# Patient Record
Sex: FEMALE | NOT HISPANIC OR LATINO
[De-identification: names, ages, dates, MRNs, and addresses within clinical notes are randomized per-mention and may not be internally consistent; named-entity substitution may affect disease eponyms.]

---

## 2018-10-28 PROBLEM — Z00.00 ENCOUNTER FOR PREVENTIVE HEALTH EXAMINATION: Status: ACTIVE | Noted: 2018-10-28

## 2018-11-01 ENCOUNTER — RECORD ABSTRACTING (OUTPATIENT)
Age: 63
End: 2018-11-01

## 2018-11-02 ENCOUNTER — APPOINTMENT (OUTPATIENT)
Dept: CARDIOLOGY | Facility: CLINIC | Age: 63
End: 2018-11-02
Payer: COMMERCIAL

## 2018-11-02 VITALS
HEART RATE: 65 BPM | DIASTOLIC BLOOD PRESSURE: 80 MMHG | SYSTOLIC BLOOD PRESSURE: 154 MMHG | HEIGHT: 57 IN | WEIGHT: 132 LBS | OXYGEN SATURATION: 98 % | BODY MASS INDEX: 28.48 KG/M2

## 2018-11-02 DIAGNOSIS — Z86.79 PERSONAL HISTORY OF OTHER DISEASES OF THE CIRCULATORY SYSTEM: ICD-10-CM

## 2018-11-02 DIAGNOSIS — Z78.9 OTHER SPECIFIED HEALTH STATUS: ICD-10-CM

## 2018-11-02 DIAGNOSIS — Z87.898 PERSONAL HISTORY OF OTHER SPECIFIED CONDITIONS: ICD-10-CM

## 2018-11-02 PROCEDURE — 99214 OFFICE O/P EST MOD 30 MIN: CPT

## 2019-09-09 ENCOUNTER — APPOINTMENT (OUTPATIENT)
Dept: CARDIOLOGY | Facility: CLINIC | Age: 64
End: 2019-09-09
Payer: COMMERCIAL

## 2019-09-09 ENCOUNTER — NON-APPOINTMENT (OUTPATIENT)
Age: 64
End: 2019-09-09

## 2019-09-09 VITALS
HEART RATE: 58 BPM | OXYGEN SATURATION: 97 % | DIASTOLIC BLOOD PRESSURE: 80 MMHG | SYSTOLIC BLOOD PRESSURE: 164 MMHG | WEIGHT: 135 LBS | HEIGHT: 59 IN | BODY MASS INDEX: 27.21 KG/M2

## 2019-09-09 DIAGNOSIS — Z86.39 PERSONAL HISTORY OF OTHER ENDOCRINE, NUTRITIONAL AND METABOLIC DISEASE: ICD-10-CM

## 2019-09-09 DIAGNOSIS — Z87.39 PERSONAL HISTORY OF OTHER DISEASES OF THE MUSCULOSKELETAL SYSTEM AND CONNECTIVE TISSUE: ICD-10-CM

## 2019-09-09 PROCEDURE — 99214 OFFICE O/P EST MOD 30 MIN: CPT | Mod: 25

## 2019-09-09 PROCEDURE — 93000 ELECTROCARDIOGRAM COMPLETE: CPT

## 2019-09-09 RX ORDER — NEBIVOLOL HYDROCHLORIDE 20 MG/1
20 TABLET ORAL DAILY
Refills: 0 | Status: DISCONTINUED | COMMUNITY
End: 2019-09-09

## 2019-09-09 RX ORDER — ATORVASTATIN CALCIUM 20 MG/1
20 TABLET, FILM COATED ORAL DAILY
Refills: 0 | Status: DISCONTINUED | COMMUNITY
End: 2019-09-09

## 2019-09-09 NOTE — REASON FOR VISIT
[Follow-Up - Clinic] : a clinic follow-up of [Hypertension] : hypertension [Chest Pain] : chest pain

## 2019-09-09 NOTE — DISCUSSION/SUMMARY
[FreeTextEntry1] : 1. Chest Pain: normal nuclear stress test October 2018 at [facility]. Patient has only recurrences when she is very stressed.\par \par 2. Hypertension: resume amlodipine 5mg daily. Continue Bystolic 10mg daily and Quinapril-HCT 20/25mg daily.\par \par 3. Hypercholesterolemia: continue atorvastatin 40mg daily.\par \par 4. Abnormal ECG: stable from previous ECGs.\par \par Follow up in 6 months.

## 2019-09-09 NOTE — PHYSICAL EXAM
[General Appearance - Well Developed] : well developed [Normal Appearance] : normal appearance [Well Groomed] : well groomed [General Appearance - In No Acute Distress] : no acute distress [General Appearance - Well Nourished] : well nourished [Eyelids - No Xanthelasma] : the eyelids demonstrated no xanthelasmas [Normal Conjunctiva] : the conjunctiva exhibited no abnormalities [No Oral Pallor] : no oral pallor [FreeTextEntry1] : No JVD, no carotid artery bruits auscultated bilaterally [No Oral Cyanosis] : no oral cyanosis [Exaggerated Use Of Accessory Muscles For Inspiration] : no accessory muscle use [Respiration, Rhythm And Depth] : normal respiratory rhythm and effort [Heart Rate And Rhythm] : heart rate and rhythm were normal [Auscultation Breath Sounds / Voice Sounds] : lungs were clear to auscultation bilaterally [Heart Sounds] : normal S1 and S2 [Edema] : no peripheral edema present [Murmurs] : no murmurs present [Abnormal Walk] : normal gait [Gait - Sufficient For Exercise Testing] : the gait was sufficient for exercise testing [Cyanosis, Localized] : no localized cyanosis [Nail Clubbing] : no clubbing of the fingernails [Skin Turgor] : normal skin turgor [] : no rash [Impaired Insight] : insight and judgment were intact [Affect] : the affect was normal [Memory Recent] : recent memory was not impaired

## 2019-09-09 NOTE — HISTORY OF PRESENT ILLNESS
[FreeTextEntry1] : This is a 64 year old female with history of hypertension, HLD, chest pain presents for routine cardiac follow up. She admits to type A personality. A lot of stress. She went to hospital in October 2018 for high blood pressure and chest pain. She had a normal nuclear stress test. She states she was discharged on amlodipine but ran out and stopped taking it. She is compliant with her other BP medications and reports no advers effects.

## 2019-11-04 ENCOUNTER — APPOINTMENT (OUTPATIENT)
Dept: CARDIOLOGY | Facility: CLINIC | Age: 64
End: 2019-11-04
Payer: COMMERCIAL

## 2019-11-04 VITALS
OXYGEN SATURATION: 97 % | HEART RATE: 60 BPM | DIASTOLIC BLOOD PRESSURE: 70 MMHG | WEIGHT: 135 LBS | HEIGHT: 59 IN | BODY MASS INDEX: 27.21 KG/M2 | SYSTOLIC BLOOD PRESSURE: 136 MMHG

## 2019-11-04 PROCEDURE — 99214 OFFICE O/P EST MOD 30 MIN: CPT

## 2019-11-04 RX ORDER — ATORVASTATIN CALCIUM 40 MG/1
40 TABLET, FILM COATED ORAL
Qty: 30 | Refills: 0 | Status: DISCONTINUED | COMMUNITY
Start: 2019-03-18 | End: 2019-11-04

## 2019-11-04 NOTE — DISCUSSION/SUMMARY
[FreeTextEntry1] : 1. Chest Pain: normal nuclear stress test October 2018 at [facility]. Patient has no recurrences \par \par 2. Hypertension: cont. amlodipine 5mg daily. Continue Bystolic 10mg daily and Quinapril-HCT 20/25mg daily.\par \par 3. Hypercholesterolemia: continue atorvastatin 40mg daily.\par \par 4. Abnormal ECG: stable from previous ECGs.\par \par Follow up in 12 months.

## 2019-11-04 NOTE — HISTORY OF PRESENT ILLNESS
[FreeTextEntry1] : This is a 64 year old female with history of hypertension, HLD, chest pain presents for routine cardiac follow up. She admits to type A personality. A lot of stress. She went to hospital in October 2018 for high blood pressure and chest pain. She had a normal nuclear stress test. She states she was discharged on amlodipine but ran out and stopped taking it. She is compliant with her other BP medications and reports no adverse effects.

## 2019-11-04 NOTE — PHYSICAL EXAM
[General Appearance - Well Developed] : well developed [Normal Appearance] : normal appearance [Well Groomed] : well groomed [General Appearance - Well Nourished] : well nourished [General Appearance - In No Acute Distress] : no acute distress [Normal Conjunctiva] : the conjunctiva exhibited no abnormalities [Eyelids - No Xanthelasma] : the eyelids demonstrated no xanthelasmas [No Oral Pallor] : no oral pallor [No Oral Cyanosis] : no oral cyanosis [Respiration, Rhythm And Depth] : normal respiratory rhythm and effort [Exaggerated Use Of Accessory Muscles For Inspiration] : no accessory muscle use [Auscultation Breath Sounds / Voice Sounds] : lungs were clear to auscultation bilaterally [Heart Rate And Rhythm] : heart rate and rhythm were normal [Heart Sounds] : normal S1 and S2 [Murmurs] : no murmurs present [Edema] : no peripheral edema present [Abnormal Walk] : normal gait [Gait - Sufficient For Exercise Testing] : the gait was sufficient for exercise testing [Nail Clubbing] : no clubbing of the fingernails [Cyanosis, Localized] : no localized cyanosis [Skin Turgor] : normal skin turgor [] : no rash [Impaired Insight] : insight and judgment were intact [Affect] : the affect was normal [Memory Recent] : recent memory was not impaired [FreeTextEntry1] : No JVD, no carotid artery bruits auscultated bilaterally

## 2020-11-03 ENCOUNTER — APPOINTMENT (OUTPATIENT)
Dept: CARDIOLOGY | Facility: CLINIC | Age: 65
End: 2020-11-03
Payer: MEDICARE

## 2020-11-03 ENCOUNTER — NON-APPOINTMENT (OUTPATIENT)
Age: 65
End: 2020-11-03

## 2020-11-03 VITALS
WEIGHT: 134 LBS | TEMPERATURE: 97.1 F | SYSTOLIC BLOOD PRESSURE: 172 MMHG | DIASTOLIC BLOOD PRESSURE: 80 MMHG | HEIGHT: 59 IN | OXYGEN SATURATION: 99 % | HEART RATE: 67 BPM | BODY MASS INDEX: 27.01 KG/M2

## 2020-11-03 PROCEDURE — 99214 OFFICE O/P EST MOD 30 MIN: CPT

## 2020-11-03 PROCEDURE — 93000 ELECTROCARDIOGRAM COMPLETE: CPT

## 2020-11-03 PROCEDURE — 99072 ADDL SUPL MATRL&STAF TM PHE: CPT

## 2020-11-03 NOTE — HISTORY OF PRESENT ILLNESS
[FreeTextEntry1] : This is a 65 year old female with history of hypertension, HLD, chest pain presents for routine cardiac follow up. She admits to type A personality. A lot of stress. She went to hospital in October 2018 for high blood pressure and chest pain. She had a normal nuclear stress test. She states she was discharged on amlodipine but ran out and stopped taking it. She is compliant with her other BP medications and reports no adverse effects.

## 2020-11-03 NOTE — DISCUSSION/SUMMARY
[FreeTextEntry1] : 1. Chest Pain resolved.  normal nuclear stress test October 2018 at [facility]. Patient has no recurrences \par \par 2. Hypertension: cont. amlodipine 5mg daily. Continue Bystolic 10mg daily and Quinapril-HCT 20/25mg daily.\par \par 3. Hypercholesterolemia: continue atorvastatin 40mg daily.\par \par 4. Abnormal ECG: stable from previous ECGs.\par \par Follow up in 12 months. \par Blood pressure recheck today.  140/78.  Continue blood pressure monitoring.

## 2021-04-16 ENCOUNTER — NON-APPOINTMENT (OUTPATIENT)
Age: 66
End: 2021-04-16

## 2021-04-16 RX ORDER — AMLODIPINE BESYLATE 5 MG/1
5 TABLET ORAL
Qty: 90 | Refills: 3 | Status: DISCONTINUED | COMMUNITY
End: 2021-04-16

## 2021-04-16 RX ORDER — QUINAPRIL AND HYDROCHLOROTHIAZIDE 25; 20 MG/1; MG/1
20-25 TABLET ORAL DAILY
Qty: 90 | Refills: 0 | Status: DISCONTINUED | COMMUNITY
Start: 2019-03-18 | End: 2021-04-16

## 2021-04-21 ENCOUNTER — APPOINTMENT (OUTPATIENT)
Dept: CARDIOLOGY | Facility: CLINIC | Age: 66
End: 2021-04-21
Payer: MEDICARE

## 2021-04-21 VITALS
TEMPERATURE: 97.1 F | BODY MASS INDEX: 27.48 KG/M2 | WEIGHT: 140 LBS | SYSTOLIC BLOOD PRESSURE: 150 MMHG | OXYGEN SATURATION: 98 % | DIASTOLIC BLOOD PRESSURE: 80 MMHG | HEIGHT: 60 IN | HEART RATE: 71 BPM

## 2021-04-21 PROCEDURE — 99214 OFFICE O/P EST MOD 30 MIN: CPT

## 2021-04-21 PROCEDURE — 99072 ADDL SUPL MATRL&STAF TM PHE: CPT

## 2021-04-21 NOTE — PHYSICAL EXAM
[General Appearance - Well Developed] : well developed [Normal Appearance] : normal appearance [General Appearance - Well Nourished] : well nourished [Well Groomed] : well groomed [No Deformities] : no deformities [General Appearance - In No Acute Distress] : no acute distress [Normal Conjunctiva] : the conjunctiva exhibited no abnormalities [Eyelids - No Xanthelasma] : the eyelids demonstrated no xanthelasmas [FreeTextEntry1] : No JVD, no carotid artery bruits auscultated bilaterally [Respiration, Rhythm And Depth] : normal respiratory rhythm and effort [Exaggerated Use Of Accessory Muscles For Inspiration] : no accessory muscle use [Auscultation Breath Sounds / Voice Sounds] : lungs were clear to auscultation bilaterally [Heart Rate And Rhythm] : heart rate and rhythm were normal [Heart Sounds] : normal S1 and S2 [Murmurs] : no murmurs present [Abnormal Walk] : normal gait [Gait - Sufficient For Exercise Testing] : the gait was sufficient for exercise testing [Nail Clubbing] : no clubbing of the fingernails [Cyanosis, Localized] : no localized cyanosis [Petechial Hemorrhages (___cm)] : no petechial hemorrhages [] : no ischemic changes

## 2021-04-21 NOTE — HISTORY OF PRESENT ILLNESS
[FreeTextEntry1] : 66 year old female with history of HTN, HLD, previous chest pain episode presents for a cardiac evaluation because of uncontrolled HTN. Patient states that the lowest the BP is at home is in the 150s systolic. \par \par There is no history of MI, CVA, CHF, or previous coronary intervention.\par

## 2021-04-21 NOTE — DISCUSSION/SUMMARY
[FreeTextEntry1] : 1. Atypical Chest Pain: resolved. Pharmacologic nuclear stress testing normal in 2016.\par \par 2. HTN: uncontrolled. Recommend discontinuing Bystolic. Prescribe Labetalol 200mg BID. Decrease dose of HCTZ from 50mg-->25mg daily. Recommend continuing amlodiopine-Valsartan 10/320mg daily. Low salt diet. Goal BP less than 130/80.\par \par 3. HLD: continue atorvastatin 40mg daily\par \par 4. Abnormal ECG: T wave inversions stable. Normal pharmacologic nuclear stress testing in 2016. Recommend echocardiogram.\par \par Follow up for BP check after echocardiogram.

## 2021-04-21 NOTE — REVIEW OF SYSTEMS
[see HPI] : see HPI [Joint Pain] : joint pain [Joint Stiffness] : joint stiffness [Negative] : Respiratory

## 2021-05-05 ENCOUNTER — APPOINTMENT (OUTPATIENT)
Dept: CARDIOLOGY | Facility: CLINIC | Age: 66
End: 2021-05-05
Payer: MEDICARE

## 2021-05-05 PROCEDURE — 93306 TTE W/DOPPLER COMPLETE: CPT

## 2021-05-05 PROCEDURE — 99072 ADDL SUPL MATRL&STAF TM PHE: CPT

## 2021-05-10 ENCOUNTER — APPOINTMENT (OUTPATIENT)
Dept: CARDIOLOGY | Facility: CLINIC | Age: 66
End: 2021-05-10
Payer: MEDICARE

## 2021-05-10 VITALS
OXYGEN SATURATION: 100 % | SYSTOLIC BLOOD PRESSURE: 136 MMHG | BODY MASS INDEX: 27.09 KG/M2 | TEMPERATURE: 97.7 F | HEART RATE: 71 BPM | WEIGHT: 138 LBS | DIASTOLIC BLOOD PRESSURE: 70 MMHG | HEIGHT: 60 IN

## 2021-05-10 PROCEDURE — 99072 ADDL SUPL MATRL&STAF TM PHE: CPT

## 2021-05-10 PROCEDURE — 99214 OFFICE O/P EST MOD 30 MIN: CPT

## 2021-05-10 RX ORDER — NEBIVOLOL HYDROCHLORIDE 10 MG/1
10 TABLET ORAL
Qty: 30 | Refills: 0 | Status: DISCONTINUED | COMMUNITY
Start: 2019-04-12 | End: 2021-05-10

## 2021-05-10 NOTE — HISTORY OF PRESENT ILLNESS
[FreeTextEntry1] : 66 year old female with history of HTN, HLD, previous chest pain episode presents for a cardiac evaluation because of uncontrolled HTN. Patient states that the lowest the BP is at home is in the 150s systolic. \par \par There is no history of MI, CVA, CHF, or previous coronary intervention.

## 2021-05-10 NOTE — PHYSICAL EXAM
[Normal] : moves all extremities, no focal deficits, normal speech [de-identified] : No JVD, no carotid artery bruits auscultated bilaterally

## 2021-05-10 NOTE — DISCUSSION/SUMMARY
[FreeTextEntry1] : 1. Atypical Chest Pain: resolved. Pharmacologic nuclear stress testing normal in 2018.\par \par 2. HTN: controlled. Recommend continuing Labetalol 200mg BID. Continue HCTZ 25mg daily. Recommend continuing amlodiopine-Valsartan 10/320mg daily. Low salt diet. Goal BP less than 130/80.\par \par 3. HLD: continue atorvastatin 40mg daily\par \par 4. Abnormal ECG: T wave inversions stable. Normal pharmacologic nuclear stress testing in 2016. Normal echocardiogram 5/5/2021\par \par Follow up in 6 months.

## 2021-11-18 ENCOUNTER — NON-APPOINTMENT (OUTPATIENT)
Age: 66
End: 2021-11-18

## 2021-11-18 ENCOUNTER — APPOINTMENT (OUTPATIENT)
Dept: CARDIOLOGY | Facility: CLINIC | Age: 66
End: 2021-11-18
Payer: MEDICARE

## 2021-11-18 VITALS
BODY MASS INDEX: 23 KG/M2 | HEIGHT: 62 IN | WEIGHT: 125 LBS | HEART RATE: 71 BPM | SYSTOLIC BLOOD PRESSURE: 132 MMHG | OXYGEN SATURATION: 96 % | DIASTOLIC BLOOD PRESSURE: 70 MMHG

## 2021-11-18 DIAGNOSIS — R07.89 OTHER CHEST PAIN: ICD-10-CM

## 2021-11-18 PROCEDURE — 93000 ELECTROCARDIOGRAM COMPLETE: CPT

## 2021-11-18 PROCEDURE — 99215 OFFICE O/P EST HI 40 MIN: CPT | Mod: 25

## 2021-11-18 RX ORDER — AMLODIPINE AND VALSARTAN 10; 320 MG/1; MG/1
10-320 TABLET, FILM COATED ORAL DAILY
Qty: 90 | Refills: 3 | Status: ACTIVE | COMMUNITY
Start: 2021-04-16 | End: 1900-01-01

## 2021-11-18 RX ORDER — HYDROCHLOROTHIAZIDE 25 MG/1
25 TABLET ORAL
Qty: 90 | Refills: 3 | Status: ACTIVE | COMMUNITY
Start: 1900-01-01 | End: 1900-01-01

## 2021-11-18 RX ORDER — HYDROXYCHLOROQUINE SULFATE 200 MG/1
200 TABLET, FILM COATED ORAL DAILY
Refills: 0 | Status: DISCONTINUED | COMMUNITY
End: 2021-11-18

## 2021-11-18 NOTE — DISCUSSION/SUMMARY
[FreeTextEntry1] : 1. Atypical Chest Pain: resolved. Pharmacologic nuclear stress testing normal in 2018.\par \par 2. HTN: controlled. Recommend continuing Labetalol 200mg BID (high risk medication with no signs of toxicity). Continue HCTZ 25mg daily. Recommend continuing amlodiopine-Valsartan 10/320mg daily. Low salt diet. Goal BP less than 130/80.\par \par 3. HLD: continue atorvastatin 40mg daily\par \par 4. Abnormal ECG: T wave inversions stable. Normal pharmacologic nuclear stress testing in 2016. Normal echocardiogram 5/5/2021\par \par Follow up in 6 months.

## 2021-11-18 NOTE — PHYSICAL EXAM
[Normal] : moves all extremities, no focal deficits, normal speech [de-identified] : No JVD, no carotid artery bruits auscultated bilaterally

## 2021-11-18 NOTE — CARDIOLOGY SUMMARY
[de-identified] : 11/18/2021, NSR with iRBBB, T wave inversions.  [de-identified] : 10/23/2018, Pharmacologic Nuclear Stress Testing: no ischemia or evidence of prior infarction noted on SPECT images [de-identified] : 5/5/2021, LVEF 65%, mild MR, mildly dilated LA, trace TR with estimated PASP 16mmHg.

## 2021-11-18 NOTE — HISTORY OF PRESENT ILLNESS
[FreeTextEntry1] : Historical Perspective:\par 66 year old female with history of HTN, HLD, previous chest pain episode presents for a cardiac evaluation because of uncontrolled HTN. Patient states that the lowest the BP is at home is in the 150s systolic. \par \par There is no history of MI, CVA, CHF, or previous coronary intervention.\par \par Current Health Status:\par Patient with no chest pain, SOB, or palpitations. No hospitalizations since seeing me last. Remains compliant with his medications and reports no adverse effects.\par

## 2021-12-03 ENCOUNTER — NON-APPOINTMENT (OUTPATIENT)
Age: 66
End: 2021-12-03

## 2021-12-03 RX ORDER — LABETALOL HYDROCHLORIDE 100 MG/1
100 TABLET, FILM COATED ORAL TWICE DAILY
Qty: 180 | Refills: 3 | Status: ACTIVE | COMMUNITY
Start: 2021-04-21 | End: 1900-01-01

## 2022-02-21 ENCOUNTER — NON-APPOINTMENT (OUTPATIENT)
Age: 67
End: 2022-02-21

## 2022-02-21 ENCOUNTER — APPOINTMENT (OUTPATIENT)
Dept: FAMILY MEDICINE | Facility: CLINIC | Age: 67
End: 2022-02-21
Payer: MEDICARE

## 2022-02-21 VITALS
RESPIRATION RATE: 16 BRPM | HEIGHT: 62 IN | SYSTOLIC BLOOD PRESSURE: 134 MMHG | DIASTOLIC BLOOD PRESSURE: 76 MMHG | TEMPERATURE: 98 F | BODY MASS INDEX: 24.69 KG/M2 | OXYGEN SATURATION: 99 % | HEART RATE: 76 BPM | WEIGHT: 134.2 LBS

## 2022-02-21 DIAGNOSIS — R42 DIZZINESS AND GIDDINESS: ICD-10-CM

## 2022-02-21 DIAGNOSIS — J30.2 OTHER SEASONAL ALLERGIC RHINITIS: ICD-10-CM

## 2022-02-21 DIAGNOSIS — Z23 ENCOUNTER FOR IMMUNIZATION: ICD-10-CM

## 2022-02-21 LAB
BILIRUB UR QL STRIP: NEGATIVE
CLARITY UR: CLEAR
COLLECTION METHOD: NORMAL
GLUCOSE UR-MCNC: NEGATIVE
HCG UR QL: 0.2 EU/DL
HGB UR QL STRIP.AUTO: NEGATIVE
KETONES UR-MCNC: NEGATIVE
LEUKOCYTE ESTERASE UR QL STRIP: NEGATIVE
NITRITE UR QL STRIP: NEGATIVE
PH UR STRIP: 7
PROT UR STRIP-MCNC: NEGATIVE
SP GR UR STRIP: 1.01

## 2022-02-21 PROCEDURE — 81003 URINALYSIS AUTO W/O SCOPE: CPT | Mod: QW

## 2022-02-21 PROCEDURE — 36415 COLL VENOUS BLD VENIPUNCTURE: CPT

## 2022-02-21 PROCEDURE — 90732 PPSV23 VACC 2 YRS+ SUBQ/IM: CPT

## 2022-02-21 PROCEDURE — G0009: CPT

## 2022-02-21 PROCEDURE — 99387 INIT PM E/M NEW PAT 65+ YRS: CPT | Mod: 25

## 2022-02-21 RX ORDER — LABETALOL HYDROCHLORIDE 200 MG/1
200 TABLET, FILM COATED ORAL
Qty: 180 | Refills: 0 | Status: ACTIVE | COMMUNITY
Start: 2021-11-18

## 2022-02-21 NOTE — HISTORY OF PRESENT ILLNESS
[de-identified] : SHEILA states she has been having episodes of vertigo 2 weeks ago. It has been going on for several months. SHEILA feels like she is drunk. Mainly happens when she has allergies and when she gets up in the morning. SHEILA is taking clairitin for allergies. \par COVID Pfizer 3/23, 4/13, 11/11/21 [FreeTextEntry1] : New patient CPE.  Script for mammo and referral for colonoscopy.  Occasional dizziness and cramping in legs.\par NKDA\par [facility]

## 2022-02-21 NOTE — PLAN
[FreeTextEntry1] : Blood work obtained.\par Will call with results of blood work.\par Pneumo 23 given\par Rx: Xyxal\par Discussed vertigo.

## 2022-02-21 NOTE — HEALTH RISK ASSESSMENT
[Good] : ~his/her~  mood as  good [Never] : Never [No] : In the past 12 months have you used drugs other than those required for medical reasons? No [No falls in past year] : Patient reported no falls in the past year [0] : 2) Feeling down, depressed, or hopeless: Not at all (0) [de-identified] : cardio - Dr. Kenney ([place]) / rheum - Dr. Donaldo Farias ([place]) / ortho - Dr. Blas Galdamez ([place]) [[id number]] : 0

## 2022-02-21 NOTE — PHYSICAL EXAM
[No Acute Distress] : no acute distress [Well Nourished] : well nourished [Well Developed] : well developed [Normal Sclera/Conjunctiva] : normal sclera/conjunctiva [Well-Appearing] : well-appearing [PERRL] : pupils equal round and reactive to light [EOMI] : extraocular movements intact [Normal Outer Ear/Nose] : the outer ears and nose were normal in appearance [Normal Oropharynx] : the oropharynx was normal [Normal TMs] : both tympanic membranes were normal [No JVD] : no jugular venous distention [Supple] : supple [No Lymphadenopathy] : no lymphadenopathy [Thyroid Normal, No Nodules] : the thyroid was normal and there were no nodules present [No Respiratory Distress] : no respiratory distress  [No Accessory Muscle Use] : no accessory muscle use [Clear to Auscultation] : lungs were clear to auscultation bilaterally [Normal Rate] : normal rate  [Regular Rhythm] : with a regular rhythm [Normal S1, S2] : normal S1 and S2 [No Murmur] : no murmur heard [Pedal Pulses Present] : the pedal pulses are present [No Edema] : there was no peripheral edema [Soft] : abdomen soft [Non Tender] : non-tender [Non-distended] : non-distended [Normal Posterior Cervical Nodes] : no posterior cervical lymphadenopathy [Normal Anterior Cervical Nodes] : no anterior cervical lymphadenopathy [No CVA Tenderness] : no CVA  tenderness [No Spinal Tenderness] : no spinal tenderness [No Joint Swelling] : no joint swelling [Grossly Normal Strength/Tone] : grossly normal strength/tone [No Rash] : no rash [Coordination Grossly Intact] : coordination grossly intact [No Focal Deficits] : no focal deficits [Normal Gait] : normal gait [Deep Tendon Reflexes (DTR)] : deep tendon reflexes were 2+ and symmetric [Normal Affect] : the affect was normal [Normal Insight/Judgement] : insight and judgment were intact [Normal] : coordination was normal [Romberg's Sign] : Romberg's sign was negtive

## 2022-02-24 LAB
ALBUMIN SERPL ELPH-MCNC: 5.2 G/DL
ALP BLD-CCNC: 99 U/L
ALT SERPL-CCNC: 23 U/L
ANION GAP SERPL CALC-SCNC: 13 MMOL/L
AST SERPL-CCNC: 22 U/L
BASOPHILS # BLD AUTO: 0.05 K/UL
BASOPHILS NFR BLD AUTO: 0.6 %
BILIRUB SERPL-MCNC: 1.6 MG/DL
BUN SERPL-MCNC: 12 MG/DL
CALCIUM SERPL-MCNC: 10 MG/DL
CHLORIDE SERPL-SCNC: 94 MMOL/L
CHOLEST SERPL-MCNC: 147 MG/DL
CO2 SERPL-SCNC: 28 MMOL/L
CREAT SERPL-MCNC: 0.74 MG/DL
EOSINOPHIL # BLD AUTO: 0.06 K/UL
EOSINOPHIL NFR BLD AUTO: 0.7 %
ESTIMATED AVERAGE GLUCOSE: 111 MG/DL
GLUCOSE SERPL-MCNC: 137 MG/DL
HBA1C MFR BLD HPLC: 5.5 %
HCT VFR BLD CALC: 36 %
HDLC SERPL-MCNC: 64 MG/DL
HGB BLD-MCNC: 12.6 G/DL
IMM GRANULOCYTES NFR BLD AUTO: 0.4 %
LDLC SERPL CALC-MCNC: 49 MG/DL
LYMPHOCYTES # BLD AUTO: 0.72 K/UL
LYMPHOCYTES NFR BLD AUTO: 8.8 %
MAN DIFF?: NORMAL
MCHC RBC-ENTMCNC: 30.2 PG
MCHC RBC-ENTMCNC: 35 GM/DL
MCV RBC AUTO: 86.3 FL
MONOCYTES # BLD AUTO: 0.51 K/UL
MONOCYTES NFR BLD AUTO: 6.3 %
NEUTROPHILS # BLD AUTO: 6.77 K/UL
NEUTROPHILS NFR BLD AUTO: 83.2 %
NONHDLC SERPL-MCNC: 83 MG/DL
PLATELET # BLD AUTO: 185 K/UL
POTASSIUM SERPL-SCNC: 4.2 MMOL/L
PROT SERPL-MCNC: 7.4 G/DL
RBC # BLD: 4.17 M/UL
RBC # FLD: 12 %
SODIUM SERPL-SCNC: 135 MMOL/L
TRIGL SERPL-MCNC: 174 MG/DL
TSH SERPL-ACNC: 1.18 UIU/ML
WBC # FLD AUTO: 8.14 K/UL

## 2022-03-28 ENCOUNTER — APPOINTMENT (OUTPATIENT)
Dept: FAMILY MEDICINE | Facility: CLINIC | Age: 67
End: 2022-03-28
Payer: MEDICARE

## 2022-03-28 VITALS
TEMPERATURE: 97.8 F | BODY MASS INDEX: 31.27 KG/M2 | HEIGHT: 62 IN | OXYGEN SATURATION: 99 % | HEART RATE: 75 BPM | RESPIRATION RATE: 16 BRPM | SYSTOLIC BLOOD PRESSURE: 130 MMHG | DIASTOLIC BLOOD PRESSURE: 68 MMHG | WEIGHT: 169.94 LBS

## 2022-03-28 DIAGNOSIS — M25.562 PAIN IN LEFT KNEE: ICD-10-CM

## 2022-03-28 LAB
AMPHET UR-MCNC: NEGATIVE
BARBITURATES UR-MCNC: NEGATIVE
BENZODIAZ UR-MCNC: NEGATIVE
COCAINE METAB.OTHER UR-MCNC: NEGATIVE
DRUG SCREEN, URINE ROUTINE: YES
METHADONE UR-MCNC: NEGATIVE
OPIATES UR-MCNC: NEGATIVE
PCP UR-MCNC: NEGATIVE
THC UR QL: NEGATIVE

## 2022-03-28 PROCEDURE — 99214 OFFICE O/P EST MOD 30 MIN: CPT

## 2022-03-28 NOTE — PLAN
[FreeTextEntry1] : SHEILA will f/u with Xray and physical therapy.\par Rx: Tylenol #4 \par SHEILA will call if sy/sx do not improve.\par

## 2022-03-28 NOTE — PHYSICAL EXAM
[No Acute Distress] : no acute distress [Well Nourished] : well nourished [Well Developed] : well developed [Well-Appearing] : well-appearing [Normal Sclera/Conjunctiva] : normal sclera/conjunctiva [PERRL] : pupils equal round and reactive to light [EOMI] : extraocular movements intact [Normal Outer Ear/Nose] : the outer ears and nose were normal in appearance [Normal Oropharynx] : the oropharynx was normal [Normal TMs] : both tympanic membranes were normal [No JVD] : no jugular venous distention [No Lymphadenopathy] : no lymphadenopathy [Supple] : supple [Thyroid Normal, No Nodules] : the thyroid was normal and there were no nodules present [No Respiratory Distress] : no respiratory distress  [No Accessory Muscle Use] : no accessory muscle use [Clear to Auscultation] : lungs were clear to auscultation bilaterally [Normal Rate] : normal rate  [Regular Rhythm] : with a regular rhythm [Normal S1, S2] : normal S1 and S2 [No Murmur] : no murmur heard [Pedal Pulses Present] : the pedal pulses are present [No Edema] : there was no peripheral edema [Soft] : abdomen soft [Non Tender] : non-tender [Non-distended] : non-distended [Normal Posterior Cervical Nodes] : no posterior cervical lymphadenopathy [Normal Anterior Cervical Nodes] : no anterior cervical lymphadenopathy [No CVA Tenderness] : no CVA  tenderness [No Spinal Tenderness] : no spinal tenderness [No Joint Swelling] : no joint swelling [Grossly Normal Strength/Tone] : grossly normal strength/tone [No Rash] : no rash [Coordination Grossly Intact] : coordination grossly intact [No Focal Deficits] : no focal deficits [Normal Gait] : normal gait [Deep Tendon Reflexes (DTR)] : deep tendon reflexes were 2+ and symmetric [Normal Affect] : the affect was normal [Normal Insight/Judgement] : insight and judgment were intact [de-identified] : medial tenderness left knee

## 2022-03-28 NOTE — HISTORY OF PRESENT ILLNESS
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 \par Would like to discuss PT for both legs. 9/10\par NKDA\par [facility]

## 2022-03-29 ENCOUNTER — APPOINTMENT (OUTPATIENT)
Dept: RADIOLOGY | Facility: CLINIC | Age: 67
End: 2022-03-29
Payer: MEDICARE

## 2022-03-29 PROCEDURE — 73562 X-RAY EXAM OF KNEE 3: CPT | Mod: LT

## 2022-04-11 ENCOUNTER — OUTPATIENT (OUTPATIENT)
Dept: OUTPATIENT SERVICES | Facility: HOSPITAL | Age: 67
LOS: 1 days | End: 2022-04-11

## 2022-04-14 DIAGNOSIS — M25.562 PAIN IN LEFT KNEE: ICD-10-CM

## 2022-04-26 ENCOUNTER — APPOINTMENT (OUTPATIENT)
Dept: CARDIOLOGY | Facility: CLINIC | Age: 67
End: 2022-04-26

## 2022-07-18 ENCOUNTER — APPOINTMENT (OUTPATIENT)
Dept: MAMMOGRAPHY | Facility: CLINIC | Age: 67
End: 2022-07-18

## 2022-07-18 PROCEDURE — 77063 BREAST TOMOSYNTHESIS BI: CPT

## 2022-07-18 PROCEDURE — 77067 SCR MAMMO BI INCL CAD: CPT

## 2022-07-22 ENCOUNTER — APPOINTMENT (OUTPATIENT)
Dept: MAMMOGRAPHY | Facility: CLINIC | Age: 67
End: 2022-07-22

## 2022-07-22 PROCEDURE — 77066 DX MAMMO INCL CAD BI: CPT

## 2022-07-28 ENCOUNTER — APPOINTMENT (OUTPATIENT)
Dept: MAMMOGRAPHY | Facility: CLINIC | Age: 67
End: 2022-07-28

## 2022-07-28 ENCOUNTER — RESULT REVIEW (OUTPATIENT)
Age: 67
End: 2022-07-28

## 2022-07-28 PROCEDURE — 77065 DX MAMMO INCL CAD UNI: CPT | Mod: LT

## 2022-07-28 PROCEDURE — 19081 BX BREAST 1ST LESION STRTCTC: CPT | Mod: RT

## 2022-08-10 ENCOUNTER — OUTPATIENT (OUTPATIENT)
Dept: OUTPATIENT SERVICES | Facility: HOSPITAL | Age: 67
LOS: 1 days | End: 2022-08-10

## 2022-08-10 DIAGNOSIS — C50.919 MALIGNANT NEOPLASM OF UNSPECIFIED SITE OF UNSPECIFIED FEMALE BREAST: ICD-10-CM

## 2022-08-11 ENCOUNTER — APPOINTMENT (OUTPATIENT)
Dept: HEMATOLOGY ONCOLOGY | Facility: CLINIC | Age: 67
End: 2022-08-11

## 2022-08-11 ENCOUNTER — APPOINTMENT (OUTPATIENT)
Dept: BREAST CENTER | Facility: CLINIC | Age: 67
End: 2022-08-11

## 2022-08-11 VITALS — BODY MASS INDEX: 23.74 KG/M2 | WEIGHT: 129 LBS | TEMPERATURE: 94.6 F | HEIGHT: 62 IN

## 2022-08-11 VITALS
WEIGHT: 129 LBS | HEIGHT: 62 IN | OXYGEN SATURATION: 99 % | DIASTOLIC BLOOD PRESSURE: 74 MMHG | HEART RATE: 70 BPM | TEMPERATURE: 97.6 F | BODY MASS INDEX: 23.74 KG/M2 | SYSTOLIC BLOOD PRESSURE: 122 MMHG

## 2022-08-11 VITALS — DIASTOLIC BLOOD PRESSURE: 73 MMHG | HEART RATE: 60 BPM | SYSTOLIC BLOOD PRESSURE: 134 MMHG

## 2022-08-11 PROCEDURE — 99204 OFFICE O/P NEW MOD 45 MIN: CPT

## 2022-08-11 PROCEDURE — 99205 OFFICE O/P NEW HI 60 MIN: CPT

## 2022-08-11 RX ORDER — PREDNISONE 5 MG/1
5 TABLET ORAL
Qty: 30 | Refills: 0 | Status: DISCONTINUED | COMMUNITY
Start: 2021-12-20 | End: 2022-08-11

## 2022-08-11 RX ORDER — HYLAN G-F 20 16MG/2ML
16 SYRINGE (ML) INTRAARTICULAR
Qty: 6 | Refills: 0 | Status: DISCONTINUED | COMMUNITY
Start: 2022-01-27 | End: 2022-08-11

## 2022-08-11 RX ORDER — CLOBETASOL PROPIONATE 0.5 MG/ML
0.05 SOLUTION TOPICAL
Qty: 60 | Refills: 0 | Status: ACTIVE | COMMUNITY
Start: 2022-08-04

## 2022-08-11 NOTE — CONSULT LETTER
[Dear  ___] : Dear  [unfilled], [Courtesy Letter:] : I had the pleasure of seeing your patient, [unfilled], in my office today. [Please see my note below.] : Please see my note below. [Consult Closing:] : Thank you very much for allowing me to participate in the care of this patient.  If you have any questions, please do not hesitate to contact me. [Sincerely,] : Sincerely, [FreeTextEntry3] : Tabitha Chaudhry MD

## 2022-08-11 NOTE — PHYSICAL EXAM
[Bra Size: ___] : Bra Size: [unfilled] [Normocephalic] : normocephalic [Atraumatic] : atraumatic [Supple] : supple [No Supraclavicular Adenopathy] : no supraclavicular adenopathy [Examined in the supine and seated position] : examined in the supine and seated position [No dominant masses] : no dominant masses in right breast  [No dominant masses] : no dominant masses left breast [No Nipple Retraction] : no left nipple retraction [No Nipple Discharge] : no left nipple discharge [No Axillary Lymphadenopathy] : no left axillary lymphadenopathy [No Edema] : no edema [No Swelling] : no swelling [Full ROM] : full range of motion [No Rashes] : no rashes [No Ulceration] : no ulceration [de-identified] : small biopsy site in upper central right breast. Well healed scar.

## 2022-08-11 NOTE — HISTORY OF PRESENT ILLNESS
[FreeTextEntry1] : 66 y/o  female referred by Dr. 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 Lillian Barrett.\par \par 7/22/22-NFI, bilat mmg heterogeneously dense, TC 6.9%, *grouping of calcifications in the upper central right breast, recommend biopsy, Left negative, BIRADS 4B\par  7/28/22- Robert, Right breast, DCIS, solid, high grade, ER 0%, ID 0%\par \par Pt denies any breast lesions, discharge or masses.\par No Fhx of cancer. \par \par \par

## 2022-08-11 NOTE — PAST MEDICAL HISTORY
[Menarche Age ____] : age at menarche was [unfilled] [Living ___] : Living: [unfilled] [Age At Live Birth ___] : Age at live birth: [unfilled] [Postmenopausal] : The patient is postmenopausal [Menopause Age____] : age at menopause was [unfilled] [Total Preg ___] : G[unfilled] [FreeTextEntry7] : 15 years [FreeTextEntry8] : 8 months

## 2022-08-11 NOTE — ASSESSMENT
[FreeTextEntry1] : 66 y/o  female referred by Dr. Sheba Levi for recent diagnosis of Right DCIS, ER/WV negative.\par Here with her , Marcus.\par \par PCP is Dr. Lillian Barrett.\par \par 7/22/22-NFI, bilat mmg heterogeneously dense, TC 6.9%, *grouping of calcifications in the upper central right breast, recommend biopsy, Left negative, BIRADS 4B\par  7/28/22- Robert, Right breast, DCIS, solid, high grade, ER 0%, WV 0%\par \par Pt denies any breast lesions, discharge or masses.\par No Fhx of cancer. \par CBE: Right: Small bx site in central breast and well healed scar  UOQ. Left neg. No axillary or SC lymphadenopathy.\par Long discussion with patient and  via  regarding the biopsy results from [facility] from 07/28/2022  showing DCIS of the R breast at the 12:00 Grade 3, ER/WV negative.  Reviewed extensively the options of mastectomy vs lumpectomy with the pros and cons for both.  \par With mastectomy, options of immediate or delayed reconstruction discussed and referral to plastic surgeon offered.  Will probably need a drain for seroma and may require flap aspiration once drain removed.  Also discussed usually no radiation with mastectomy but may need if positive margins or LNs.  Discussed usually no reexcision with mastectomy. \par With lumpectomy, will need radiation and treatment was reviewed.  Will need localization.  SHEILA  is not a candidate for oncoplastic surgery.  Also discussed will need negative margins and if too close or positive, may need reexcision.   Will not need SLNBx with lumpectomy but if microinvasion or invasion found on final path, may need delayed SLNbx or axillary surgery.\par Discussed will not need hormonal therapy.  No chemotherapy for Stage 0, DCIS.  \par Genetic testing discussed and accepted. Pt has 2 daughters. [facility] genetic testing sent today.\par Rec MRI. Pt is leaning towards lumpectomy and would like to schedule for 8/31 pending MRI and genetic testing are neg. Also rec appt with Dr. Powell to discuss radiation prior to surgery. \par Multiple questions answered. Handout on surgery given and procedure for lumpectomy discussed.

## 2022-08-11 NOTE — REASON FOR VISIT
[[employer] ] : provided by [employer]   [Consultation] : a consultation visit [Interpreters_IDNumber] : [id number] [Interpreters_FullName] : elyse grubbs [[id number]] : [place]

## 2022-08-11 NOTE — DATA REVIEWED
[FreeTextEntry1] : 7/22/22-NFI, bilat mmg heterogeneously dense, TC 6.9%, *grouping of calcifications in the upper central right breast, recommend biopsy, Left negative, BIRADS 4B\par  7/28/22- Robert, Right breast, DCIS, solid, high grade, ER 0%, MI 0%\par

## 2022-08-12 NOTE — HISTORY OF PRESENT ILLNESS
[de-identified] : Referred by: Dr. Lillian Barrett ([facility]) [phone] \par Diagnosis: R breast DCIS \par Here with daughter Keara ([phone]) who provided translation per patient request \par \par Ms. oClon is a post-menopausal female who presented at age 67 in 2022 for evaluation of R breast DCIS, ER/VA negative. \par The patient has a medical history of arthritis, HTN, HLD. \par \par Magui reports that she recently underwent a screening mammogram on 2022 which revealed calcifications in the right breast.  She goes for annual breast imaging and states that she had an abnormality once in the right breast ~7-8 years ago and underwent biopsy at that time which resulted as normal.  She subsequently underwent diagnostic mammogram and biopsy on 2022 at [place] which revealed DCIS, high-grade, ER 0%, VA 0%.  She has not yet been referred to breast surgery.\par \par At this time she states she is feeling well, she denies any palpable abnormality in the right breast.  She denies any breast pain or nipple discharge.  Her appetite and energy levels are good.\par \par Imaging: \par Diagnostic MMG 22- grouping of calcifications in the upper central right breast, recommend biopsy, BIRADS 4B\par \par Path: \par R breast biopsy 22- DCIS, solid, high grade, ER 0%, VA 0%\par \par Genetics: [facility] sent 22 \par \par HCM: \par - COVID vaccination: s/p 2 doses and 1 booster \par - Colonoscopy: had one many years ago, DUE \par - Gyn: DUE \par - Mammo: annually, most recently 22 \par - DEXA: not done \par \par SH: \par - Occupation: worked as a , now retired \par - Living situation: lives in [place], she has 2 grown children \par - Smoking/etoh/illicits: remote smoker, denies etoh, ainsley illicits \par - Exercise: minimally physically active \par \par OB/GYN Hx: \par - Age of menarche: 12\par - Age of menopause: 45\par - Pregnancy history:  \par - Age at live birth: 25\par - OCP use: 15 years \par - Breast feeding history: 8 months \par \par FH: \par - No family history of cancer

## 2022-08-12 NOTE — CONSULT LETTER
[Dear  ___] : Dear  [unfilled], [Courtesy Letter:] : I had the pleasure of seeing your patient, [unfilled], in my office today. [Please see my note below.] : Please see my note below. [Consult Closing:] : Thank you very much for allowing me to participate in the care of this patient.  If you have any questions, please do not hesitate to contact me. [Sincerely,] : Sincerely, [FreeTextEntry2] : Dr. Lillian Barrett [FreeTextEntry3] : Dr. Cyn Levi\par  [DrCriss  ___] : Dr. BRANDON [DrCriss ___] : Dr. BRANDON

## 2022-08-12 NOTE — RESULTS/DATA
[FreeTextEntry1] : Ms. 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 \par The patient has a medical history of arthritis, HTN, HLD. 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 Pending surgical management. \par F/U Breast MRI. \par F/U Genetic testing. \par Has appt's pending with Dr. Chaudhry, Dr. Powell, Dr. Mendoza. \par

## 2022-08-12 NOTE — PHYSICAL EXAM
[Fully active, able to carry on all pre-disease performance without restriction] : Status 0 - Fully active, able to carry on all pre-disease performance without restriction [Normal] : affect appropriate [de-identified] : generally well appearing female, NAD  [de-identified] : s/p R breast biopsy, well healed, no palpable masses or lymphadenopathy bilaterally

## 2022-08-18 ENCOUNTER — APPOINTMENT (OUTPATIENT)
Dept: MRI IMAGING | Facility: CLINIC | Age: 67
End: 2022-08-18

## 2022-08-18 PROCEDURE — 77049 MRI BREAST C-+ W/CAD BI: CPT

## 2022-08-18 PROCEDURE — A9585: CPT

## 2022-08-19 ENCOUNTER — NON-APPOINTMENT (OUTPATIENT)
Age: 67
End: 2022-08-19

## 2022-08-19 ENCOUNTER — OUTPATIENT (OUTPATIENT)
Dept: OUTPATIENT SERVICES | Facility: HOSPITAL | Age: 67
LOS: 1 days | End: 2022-08-19

## 2022-08-19 DIAGNOSIS — D05.11 INTRADUCTAL CARCINOMA IN SITU OF RIGHT BREAST: ICD-10-CM

## 2022-08-19 DIAGNOSIS — Z00.00 ENCOUNTER FOR GENERAL ADULT MEDICAL EXAMINATION WITHOUT ABNORMAL FINDINGS: ICD-10-CM

## 2022-08-19 PROCEDURE — 88321 CONSLTJ&REPRT SLD PREP ELSWR: CPT

## 2022-08-22 ENCOUNTER — OUTPATIENT (OUTPATIENT)
Dept: OUTPATIENT SERVICES | Facility: HOSPITAL | Age: 67
LOS: 1 days | End: 2022-08-22

## 2022-08-22 PROCEDURE — 71046 X-RAY EXAM CHEST 2 VIEWS: CPT | Mod: 26

## 2022-08-22 PROCEDURE — 93010 ELECTROCARDIOGRAM REPORT: CPT

## 2022-08-23 ENCOUNTER — NON-APPOINTMENT (OUTPATIENT)
Age: 67
End: 2022-08-23

## 2022-08-24 ENCOUNTER — APPOINTMENT (OUTPATIENT)
Dept: BREAST CENTER | Facility: CLINIC | Age: 67
End: 2022-08-24

## 2022-08-24 VITALS
SYSTOLIC BLOOD PRESSURE: 114 MMHG | DIASTOLIC BLOOD PRESSURE: 66 MMHG | TEMPERATURE: 98.1 F | HEART RATE: 71 BPM | WEIGHT: 129 LBS | HEIGHT: 62 IN | BODY MASS INDEX: 23.74 KG/M2

## 2022-08-24 DIAGNOSIS — R92.8 OTHER ABNORMAL AND INCONCLUSIVE FINDINGS ON DIAGNOSTIC IMAGING OF BREAST: ICD-10-CM

## 2022-08-24 PROCEDURE — 99215 OFFICE O/P EST HI 40 MIN: CPT

## 2022-08-24 NOTE — CONSULT LETTER
[Dear  ___] : Dear  [unfilled], [Consult Letter:] : I had the pleasure of evaluating your patient, [unfilled]. [Please see my note below.] : Please see my note below. [Consult Closing:] : Thank you very much for allowing me to participate in the care of this patient.  If you have any questions, please do not hesitate to contact me. [Sincerely,] : Sincerely, [FreeTextEntry3] : Tabitha Chaudhry MD

## 2022-08-24 NOTE — DATA REVIEWED
[FreeTextEntry1] : 7/22/22-NFI, bilat mmg heterogeneously dense, TC 6.9%, *grouping of calcifications in the upper central right breast, recommend biopsy, Left negative, BIRADS 4B\par 7/28/22- Robert, Right breast, DCIS, solid, high grade, ER 0%, RI 0%\par 8/18/22 MRI L: no suspicious enhancement. R: 12:00 biopsy proven DCIS measuring 3.4cm from posterior to anterior, measuring up to 7mm in thickness. *More inferiorly at 9:00 position close to midline is 10mm nonmass like enhancement for which biopsy is recommended. 10mm T2 hyperintensity of the liver too small to accurately characterize.\par

## 2022-08-24 NOTE — PAST MEDICAL HISTORY
[Postmenopausal] : The patient is postmenopausal [Menarche Age ____] : age at menarche was [unfilled] [Menopause Age____] : age at menopause was [unfilled] [Total Preg ___] : G[unfilled] [Living ___] : Living: [unfilled] [Age At Live Birth ___] : Age at live birth: [unfilled] [FreeTextEntry7] : 15 years [FreeTextEntry8] : 8 months

## 2022-08-24 NOTE — ASSESSMENT
[FreeTextEntry1] : 66 y/o Tahir negative,  female referred by Dr. Sheba Levi for recent diagnosis of Right DCIS, ER/SC negative. Here to review recent MRI and rec for bx, tentatively has lumpectomy scheduled for 8/31/22. \par \par Here with her , Marcus. Requesting that her daughter, Keara, do the translating by phone. \par \par PCP is Dr. Lillian Barrett.\par \par 7/22/22-NFI, bilat mmg heterogeneously dense, TC 6.9%, *grouping of calcifications in the upper central right breast, recommend biopsy, Left negative, BIRADS 4B\par 7/28/22- Robert, Right breast, DCIS, solid, high grade, ER 0%, SC 0%\par 8/18/22 MRI L: no suspicious enhancement. R: 12:00 biopsy proven DCIS measuring 3.4cm from posterior to anterior, measuring up to 7mm in thickness. *More inferiorly at 9:00 position close to midline is 10mm nonmass like enhancement for which biopsy is recommended. 10mm T2 hyperintensity of the liver too small to accurately characterize.\par \par No Fhx of cancer. \par \par 8/11/22 CBE: Right: Small bx site in central breast and well healed scar UOQ. Left neg. No axillary or SC lymphadenopathy.\par \par Long discussion with patient and  and daughter Keara interpreting by phone per Pt's request.  Robert from 07/28/2022 showing DCIS of the R breast at the 12:00 Grade 3, ER/SC negative. \par Reviewed genetic test with [facility] was negative. Reviewed results of MRI, left is negative but right showed lesion at 9:00 for which MRI bx is rec. Also the primary lesion at the 12:00 is 3.4 cm AP, this may affect cosmesis and also reexcision possibility. Pt would like to have the lumpectomy if she is a candidate so would like to proceed with biopsy of the 9:00 lesion, scheduled for 8/26/22. Will keep the 8/3/22 surgery date an proceed accordingly pending biopsy.  If the 9:00 is positive for CA, may not be lumpectomy candidate, pending distance. If atypia, area may also be have to be removed. If benign without atypia, may be able to proceed with the right lumpectomy as planned. \par Multiple questions answered.

## 2022-08-24 NOTE — HISTORY OF PRESENT ILLNESS
[FreeTextEntry1] : 66 y/o Tahir negative,  female referred by Dr. Sheba Levi for recent diagnosis of Right DCIS, ER/TX negative. Here to review recent MRI and rec for bx, tentatively has lumpectomy scheduled for 8/31/22. \par \par Here with her , Marcus. Requesting that her daughter, Keara, do the translating by phone. \par \par PCP is Dr. Lillian Barrett.\par \par 7/22/22-NFI, bilat mmg heterogeneously dense, TC 6.9%, *grouping of calcifications in the upper central right breast, recommend biopsy, Left negative, BIRADS 4B\par 7/28/22- Robert, Right breast, DCIS, solid, high grade, ER 0%, TX 0%\par 8/18/22 MRI L: no suspicious enhancement. R: 12:00 biopsy proven DCIS measuring 3.4cm from posterior to anterior, measuring up to 7mm in thickness. *More inferiorly at 9:00 position close to midline is 10mm nonmass like enhancement for which biopsy is recommended. 10mm T2 hyperintensity of the liver too small to accurately characterize.\par \par No Fhx of cancer. \par \par CBE: Right: Small bx site in central breast and well healed scar UOQ. Left neg. No axillary or SC lymphadenopathy.\par Long discussion with patient and  via  regarding the biopsy results from Robert from 07/28/2022 showing DCIS of the R breast at the 12:00 Grade 3, ER/TX negative. Reviewed extensively the options of mastectomy vs lumpectomy with the pros and cons for both. \par With mastectomy, options of immediate or delayed reconstruction discussed and referral to plastic surgeon offered. Will probably need a drain for seroma and may require flap aspiration once drain removed. Also discussed usually no radiation with mastectomy but may need if positive margins or LNs. Discussed usually no reexcision with mastectomy. \par With lumpectomy, will need radiation and treatment was reviewed. Will need localization. SHEILA is not a candidate for oncoplastic surgery. Also discussed will need negative margins and if too close or positive, may need reexcision. Will not need SLNBx with lumpectomy but if microinvasion or invasion found on final path, may need delayed SLNbx or axillary surgery.\par Discussed will not need hormonal therapy. No chemotherapy for Stage 0, DCIS. \par Genetic testing discussed and accepted. Pt has 2 daughters. [facility] genetic testing sent today.\par Rec MRI. Pt is leaning towards lumpectomy and would like to schedule for 8/31 pending MRI and genetic testing are neg. Also rec appt with Dr. Powell to discuss radiation prior to surgery. \par Multiple questions answered. Handout on surgery given and procedure for lumpectomy discussed. \par

## 2022-08-26 ENCOUNTER — RESULT REVIEW (OUTPATIENT)
Age: 67
End: 2022-08-26

## 2022-08-26 ENCOUNTER — OUTPATIENT (OUTPATIENT)
Dept: OUTPATIENT SERVICES | Facility: HOSPITAL | Age: 67
LOS: 1 days | End: 2022-08-26
Payer: MEDICARE

## 2022-08-26 ENCOUNTER — APPOINTMENT (OUTPATIENT)
Dept: MRI IMAGING | Facility: CLINIC | Age: 67
End: 2022-08-26

## 2022-08-26 DIAGNOSIS — D05.11 INTRADUCTAL CARCINOMA IN SITU OF RIGHT BREAST: ICD-10-CM

## 2022-08-26 DIAGNOSIS — R92.8 OTHER ABNORMAL AND INCONCLUSIVE FINDINGS ON DIAGNOSTIC IMAGING OF BREAST: ICD-10-CM

## 2022-08-26 PROCEDURE — A9585: CPT

## 2022-08-26 PROCEDURE — 19085 BX BREAST 1ST LESION MR IMAG: CPT

## 2022-08-26 PROCEDURE — 77065 DX MAMMO INCL CAD UNI: CPT | Mod: 26,RT

## 2022-08-26 PROCEDURE — 77065 DX MAMMO INCL CAD UNI: CPT

## 2022-08-26 PROCEDURE — 88305 TISSUE EXAM BY PATHOLOGIST: CPT | Mod: 26

## 2022-08-26 PROCEDURE — 19085 BX BREAST 1ST LESION MR IMAG: CPT | Mod: RT

## 2022-08-26 PROCEDURE — 88305 TISSUE EXAM BY PATHOLOGIST: CPT

## 2022-08-26 PROCEDURE — A4648: CPT

## 2022-08-27 ENCOUNTER — TRANSCRIPTION ENCOUNTER (OUTPATIENT)
Age: 67
End: 2022-08-27

## 2022-08-30 ENCOUNTER — NON-APPOINTMENT (OUTPATIENT)
Age: 67
End: 2022-08-30

## 2022-08-31 ENCOUNTER — RESULT REVIEW (OUTPATIENT)
Age: 67
End: 2022-08-31

## 2022-08-31 ENCOUNTER — OUTPATIENT (OUTPATIENT)
Dept: OUTPATIENT SERVICES | Facility: HOSPITAL | Age: 67
LOS: 1 days | End: 2022-08-31
Payer: MEDICARE

## 2022-08-31 ENCOUNTER — APPOINTMENT (OUTPATIENT)
Dept: BREAST CENTER | Facility: HOSPITAL | Age: 67
End: 2022-08-31

## 2022-08-31 DIAGNOSIS — D05.11 INTRADUCTAL CARCINOMA IN SITU OF RIGHT BREAST: ICD-10-CM

## 2022-08-31 PROCEDURE — 14001 TIS TRNFR TRUNK 10.1-30SQCM: CPT

## 2022-08-31 PROCEDURE — 76098 X-RAY EXAM SURGICAL SPECIMEN: CPT | Mod: 26

## 2022-08-31 PROCEDURE — 19282 PERQ DEVICE BREAST EA IMAG: CPT | Mod: 59,RT

## 2022-08-31 PROCEDURE — 19281 PERQ DEVICE BREAST 1ST IMAG: CPT | Mod: RT

## 2022-08-31 PROCEDURE — 19301 PARTIAL MASTECTOMY: CPT | Mod: RT

## 2022-08-31 PROCEDURE — 88307 TISSUE EXAM BY PATHOLOGIST: CPT | Mod: 26

## 2022-09-04 DIAGNOSIS — N18.6 END STAGE RENAL DISEASE: ICD-10-CM

## 2022-09-04 DIAGNOSIS — E78.5 HYPERLIPIDEMIA, UNSPECIFIED: ICD-10-CM

## 2022-09-04 DIAGNOSIS — Z91.041 RADIOGRAPHIC DYE ALLERGY STATUS: ICD-10-CM

## 2022-09-04 DIAGNOSIS — I12.0 HYPERTENSIVE CHRONIC KIDNEY DISEASE WITH STAGE 5 CHRONIC KIDNEY DISEASE OR END STAGE RENAL DISEASE: ICD-10-CM

## 2022-09-04 DIAGNOSIS — I87.1 COMPRESSION OF VEIN: ICD-10-CM

## 2022-09-04 DIAGNOSIS — Z99.2 DEPENDENCE ON RENAL DIALYSIS: ICD-10-CM

## 2022-09-05 DIAGNOSIS — Z01.810 ENCOUNTER FOR PREPROCEDURAL CARDIOVASCULAR EXAMINATION: ICD-10-CM

## 2022-09-05 DIAGNOSIS — Z01.812 ENCOUNTER FOR PREPROCEDURAL LABORATORY EXAMINATION: ICD-10-CM

## 2022-09-05 DIAGNOSIS — D05.11 INTRADUCTAL CARCINOMA IN SITU OF RIGHT BREAST: ICD-10-CM

## 2022-09-08 ENCOUNTER — APPOINTMENT (OUTPATIENT)
Dept: BREAST CENTER | Facility: CLINIC | Age: 67
End: 2022-09-08

## 2022-09-08 VITALS
HEIGHT: 62 IN | WEIGHT: 129 LBS | HEART RATE: 73 BPM | SYSTOLIC BLOOD PRESSURE: 118 MMHG | DIASTOLIC BLOOD PRESSURE: 63 MMHG | TEMPERATURE: 97.3 F | BODY MASS INDEX: 23.74 KG/M2

## 2022-09-08 PROCEDURE — 99024 POSTOP FOLLOW-UP VISIT: CPT

## 2022-09-08 NOTE — ASSESSMENT
[FreeTextEntry1] : 66 y/o Tahir negative,  female here post op 8/31/22 R lumpectomy for recent diagnosis of Right DCIS, ER/NH negative. Here to review recent MRI and rec for bx, tentatively has lumpectomy scheduled for 8/31/22. \par Referred by Dr. Sheba Levi\par \par 8/31/22 Surgical PATH: DCIS, Gr 3, at least 1.7 cm, Margins, <1 mm at superior, rest >2 mm. \par \par Here with her daughter, Keara.\par \par PCP is Dr. Lillian Barrett.\par \par 7/22/22-NFI, bilat mmg heterogeneously dense, TC 6.9%, *grouping of calcifications in the upper central right breast, recommend biopsy, Left negative, BIRADS 4B\par 7/28/22- Robert, Right breast, DCIS, solid, high grade, ER 0%, NH 0%\par 8/18/22 MRI L: no suspicious enhancement. R: 12:00 biopsy proven DCIS measuring 3.4cm from posterior to anterior, measuring up to 7mm in thickness. *More inferiorly at 9:00 position close to midline is 10mm nonmass like enhancement for which biopsy is recommended. 10mm T2 hyperintensity of the liver too small to accurately characterize.\par 8/26/22, R MRI bx 8-9:00 PATH: negative for malignancy, FA change, benign and concordant, rec surg/onc mgmt - MRI 1 year of bx lesion \par \par No Fhx of cancer. \par \par CBE: Right: 12:00 inc intact and no hematoma or cellulitis. \par \par Reviewed path with pt and daughter. DCIS, grade 3, negative margins but <1 mm superior margin, rest is >2 mm. Rec appt with Dr. Powell, ? benefit of reexcision, will also evaluate to see extent of <1 mm margin, small focus or wide area. IF reexc needed would use same incision. Tumor ER/NH negative. \par

## 2022-09-08 NOTE — HISTORY OF PRESENT ILLNESS
[FreeTextEntry1] : 68 y/o Tahir negative,  female here post op 8/31/22 R lumpectomy for recent diagnosis of Right DCIS, ER/MT negative. Here to review recent MRI and rec for bx, tentatively has lumpectomy scheduled for 8/31/22. \par Referred by Dr. Sheba Levi\par \par 8/31/22 Surgical PATH: DCIS, Gr 3, at least 1.7 cm, Margins, , 1 mm at superior, rest >2 mm. \par \par Here with her daughter, Keara.\par \par PCP is Dr. Lillian Barrett.\par \par 7/22/22-NFI, bilat mmg heterogeneously dense, TC 6.9%, *grouping of calcifications in the upper central right breast, recommend biopsy, Left negative, BIRADS 4B\par 7/28/22- Robert, Right breast, DCIS, solid, high grade, ER 0%, MT 0%\par 8/18/22 MRI L: no suspicious enhancement. R: 12:00 biopsy proven DCIS measuring 3.4cm from posterior to anterior, measuring up to 7mm in thickness. *More inferiorly at 9:00 position close to midline is 10mm nonmass like enhancement for which biopsy is recommended. 10mm T2 hyperintensity of the liver too small to accurately characterize.\par 8/26/22, R MRI bx 8-9:00 PATH: negative for malignancy, FA change, benign and concordant, rec surg/onc mgmt - MRI 1 year of bx lesion \par \par No Fhx of cancer. \par \par 8/11/22 CBE: Right: Small bx site in central breast and well healed scar UOQ. Left neg. No axillary or SC lymphadenopathy.\par

## 2022-09-12 ENCOUNTER — APPOINTMENT (OUTPATIENT)
Dept: MRI IMAGING | Facility: CLINIC | Age: 67
End: 2022-09-12

## 2022-09-13 ENCOUNTER — OUTPATIENT (OUTPATIENT)
Dept: OUTPATIENT SERVICES | Facility: HOSPITAL | Age: 67
LOS: 1 days | End: 2022-09-13

## 2022-09-13 DIAGNOSIS — C50.919 MALIGNANT NEOPLASM OF UNSPECIFIED SITE OF UNSPECIFIED FEMALE BREAST: ICD-10-CM

## 2022-09-15 NOTE — HISTORY OF PRESENT ILLNESS
[de-identified] : Referred by: Dr. Lillian Barrett ([facility]) [phone] \par Diagnosis: R breast DCIS \par Here with daughter Keara ([phone]) who provided translation per patient request \par \par Ms. Colon is a post-menopausal female who presented at age 67 in 2022 for evaluation of R breast DCIS, ER/MA negative. \par The patient has a medical history of arthritis, HTN, HLD. \par \par Presenting HPI: Magui reports that she recently underwent a screening mammogram on 2022 which revealed calcifications in the right breast.  She goes for annual breast imaging and states that she had an abnormality once in the right breast ~7-8 years ago and underwent biopsy at that time which resulted as normal.  She subsequently underwent diagnostic mammogram and biopsy on 2022 at [place] which revealed DCIS, high-grade, ER 0%, MA 0%.  She has not yet been referred to breast surgery.\par \par At this time she states she is feeling well, she denies any palpable abnormality in the right breast.  She denies any breast pain or nipple discharge.  Her appetite and energy levels are good.\par \par Imaging: \par Diagnostic MMG 22- grouping of calcifications in the upper central right breast, recommend biopsy, BIRADS 4B\par \par Path: \par R breast biopsy 22- DCIS, solid, high grade, ER 0%, MA 0%\par \par Genetics: [facility] genetics sent 22 \par \par HCM: \par - COVID vaccination: s/p 2 doses and 1 booster \par - Colonoscopy: had one many years ago, DUE \par - Gyn: DUE \par - Mammo: annually, most recently 22 \par - DEXA: not done \par \par SH: \par - Occupation: worked as a , now retired \par - Living situation: lives in [place], she has 2 grown children \par - Smoking/etoh/illicits: remote smoker, denies etoh, ainsley illicits \par - Exercise: minimally physically active \par \par OB/GYN Hx: \par - Age of menarche: 12\par - Age of menopause: 45\par - Pregnancy history:  \par - Age at live birth: 25\par - OCP use: 15 years \par - Breast feeding history: 8 months \par \par FH: \par - No family history of cancer  [de-identified] : Magui presents on 9/16/22 for follow up for R breast DCIS. \par \par ER/DE negative- pending eval by Dr. Powell\par Saw Dr. Chaudhry on 9/8/22- she is now s/p R lumpectomy on 8/31/22 Surgical PATH: DCIS, Gr 3, at least 1.7 cm, Margins, <1 mm at superior, rest >2 mm. \par May require re-excision. \par Sent email to path to repeat receptors. \par \par Health Care Maintenance: Updated *** \par Mammogram: \par Colonoscopy: \par Pap smear: \par Dentist: \par PCP: \par Dermatology screen: \par Genetic screen:\par DEXA: \par \par Vaccinations: \par COVID vaccination: \par Flu vaccine:\par

## 2022-09-16 ENCOUNTER — APPOINTMENT (OUTPATIENT)
Dept: HEMATOLOGY ONCOLOGY | Facility: CLINIC | Age: 67
End: 2022-09-16

## 2022-09-19 ENCOUNTER — NON-APPOINTMENT (OUTPATIENT)
Age: 67
End: 2022-09-19

## 2022-09-26 ENCOUNTER — APPOINTMENT (OUTPATIENT)
Dept: BREAST CENTER | Facility: CLINIC | Age: 67
End: 2022-09-26

## 2022-09-26 VITALS
HEIGHT: 62 IN | WEIGHT: 129 LBS | SYSTOLIC BLOOD PRESSURE: 123 MMHG | TEMPERATURE: 97.3 F | DIASTOLIC BLOOD PRESSURE: 71 MMHG | BODY MASS INDEX: 23.74 KG/M2 | HEART RATE: 60 BPM

## 2022-09-26 PROCEDURE — 99024 POSTOP FOLLOW-UP VISIT: CPT

## 2022-09-26 NOTE — CONSULT LETTER
[Dear  ___] : Dear  [unfilled], [Please see my note below.] : Please see my note below. [Consult Closing:] : Thank you very much for allowing me to participate in the care of this patient.  If you have any questions, please do not hesitate to contact me. [Sincerely,] : Sincerely, [FreeTextEntry3] : Tabitha Chaudhry MD

## 2022-09-26 NOTE — ASSESSMENT
[FreeTextEntry1] : 66 y/o Tahir negative,  female here post op 8/31/22 R lumpectomy for recent diagnosis of Right DCIS, ER/WV negative. \par Referred by Dr. Sheba Levi\par \par 8/31/22 Surgical PATH: DCIS, Gr 3, at least 1.7 cm, Margins, <1 mm at superior in area of 3 mm, rest >2 mm. \par \par Here with her daughter, Alanna, today.\par \par Saw. Dr Powell 9/16/22, discussion about reexcision done and pt does not want reexcision (standard of care rec), she opted for EBRT with boost. \par \par PCP is Dr. Lillian Barrett.\par \par 7/22/22-NFI, bilat mmg heterogeneously dense, TC 6.9%, *grouping of calcifications in the upper central right breast, recommend biopsy, Left negative, BIRADS 4B\par 7/28/22- Robert, Right breast, DCIS, solid, high grade, ER 0%, WV 0%\par 8/18/22 MRI L: no suspicious enhancement. R: 12:00 biopsy proven DCIS measuring 3.4cm from posterior to anterior, measuring up to 7mm in thickness. *More inferiorly at 9:00 position close to midline is 10mm nonmass like enhancement for which biopsy is recommended. 10mm T2 hyperintensity of the liver too small to accurately characterize.\par 8/26/22, R MRI bx 8-9:00 PATH: negative for malignancy, FA change, benign and concordant, rec surg/onc mgmt - MRI 1 year of bx lesion \par \par No Fhx of cancer. \par \par CBE: Right: 12:00 inc intact and no hematoma or cellulitis. \par \par Reviewed path with pt and daughter. DCIS, grade 3, negative margins but <1 mm superior margin of 3 mm, rest is >2 mm. Repeat ER/WV was negative.  Discussed with pt and daughter, standard of care of would be reexcision, as it would decreased the recurrence risk by <5% at 5 years-per discussion with Dr. Powell.  Also would use the same incision and reexcise only the superior margin. Pt understands this but will take the chance and does not want reexcision. She will have the radiation with boost. \par

## 2022-09-26 NOTE — HISTORY OF PRESENT ILLNESS
[FreeTextEntry1] : 66 y/o Tahir negative,  female here post op 8/31/22 R lumpectomy for recent diagnosis of Right DCIS, ER/UT negative. \par Referred by Dr. Sheba Levi\par \par 8/31/22 Surgical PATH: DCIS, Gr 3, at least 1.7 cm, Margins, <1 mm at superior in area of 3 mm, rest >2 mm. \par \par Here with her daughter, Alanna, today.\par \par Saw. Dr Powell 9/16/22, discussion about reexcision done and pt does not want reexcision (standard of care rec), she opted for EBRT with boost. \par \par PCP is Dr. Lillian Barrett.\par \par 7/22/22-NFI, bilat mmg heterogeneously dense, TC 6.9%, *grouping of calcifications in the upper central right breast, recommend biopsy, Left negative, BIRADS 4B\par 7/28/22- Robert, Right breast, DCIS, solid, high grade, ER 0%, [place] 0%\par 8/18/22 MRI L: no suspicious enhancement. R: 12:00 biopsy proven DCIS measuring 3.4cm from posterior to anterior, measuring up to 7mm in thickness. *More inferiorly at 9:00 position close to midline is 10mm nonmass like enhancement for which biopsy is recommended. 10mm T2 hyperintensity of the liver too small to accurately characterize.\par 8/26/22, R MRI bx 8-9:00 PATH: negative for malignancy, FA change, benign and concordant, rec surg/onc mgmt - MRI 1 year of bx lesion \par \par No Fhx of cancer. \par \par CBE: Right: 12:00 inc intact and no hematoma or cellulitis. \par \par Reviewed path with pt and daughter. DCIS, grade 3, negative margins but <1 mm superior margin, rest is >2 mm. Rec appt with Dr. Powell, ? benefit of reexcision, will also evaluate to see extent of <1 mm margin, small focus or wide area. IF reexc needed would use same incision. Tumor ER/UT negative. \par

## 2022-09-26 NOTE — DATA REVIEWED
[FreeTextEntry1] : 8/31/22 Surgical PATH: DCIS, Gr 3, at least 1.7 cm, Margins, <1 mm at superior in area of 3 mm, rest >2 mm. \par

## 2022-09-27 ENCOUNTER — NON-APPOINTMENT (OUTPATIENT)
Age: 67
End: 2022-09-27

## 2022-10-07 ENCOUNTER — APPOINTMENT (OUTPATIENT)
Dept: HEMATOLOGY ONCOLOGY | Facility: CLINIC | Age: 67
End: 2022-10-07

## 2022-10-07 VITALS
TEMPERATURE: 97.9 F | OXYGEN SATURATION: 100 % | DIASTOLIC BLOOD PRESSURE: 81 MMHG | SYSTOLIC BLOOD PRESSURE: 146 MMHG | HEART RATE: 66 BPM | WEIGHT: 127.4 LBS | HEIGHT: 62 IN | BODY MASS INDEX: 23.45 KG/M2

## 2022-10-07 PROCEDURE — 99214 OFFICE O/P EST MOD 30 MIN: CPT

## 2022-10-07 RX ORDER — METHYLPREDNISOLONE 4 MG/1
4 TABLET ORAL
Qty: 40 | Refills: 0 | Status: DISCONTINUED | COMMUNITY
Start: 2022-03-15 | End: 2022-10-07

## 2022-10-07 RX ORDER — METHYLPREDNISOLONE 4 MG/1
4 TABLET ORAL
Qty: 1 | Refills: 0 | Status: DISCONTINUED | COMMUNITY
Start: 2022-03-28 | End: 2022-10-07

## 2022-10-07 NOTE — PHYSICAL EXAM
[Fully active, able to carry on all pre-disease performance without restriction] : Status 0 - Fully active, able to carry on all pre-disease performance without restriction [Normal] : affect appropriate [de-identified] : generally well appearing female, NAD  [de-identified] : s/p R breast biopsy, well healed, no palpable masses or lymphadenopathy bilaterally

## 2022-10-07 NOTE — RESULTS/DATA
[FreeTextEntry1] : Ms. Colon is a post-menopausal female who presented at age 67 in August 2022 for evaluation of R breast DCIS, ER/SC negative. \par The patient has a medical history of arthritis, HTN, HLD. \par \par R breast DCIS, ER/SC negative. S/p R lumpectomy on 8/31/22 Surgical PATH: DCIS, Gr 3, at least 1.7 cm, Margins, <1 mm at superior, rest >2 mm. \par No plan for surgical re-excision \par Receptors repeated- remains ER/SC negative. \par Pending RT with Dr. Powell. \par No indication for anti-estrogen therapy. \par RTC 6 months. \par

## 2022-10-07 NOTE — HISTORY OF PRESENT ILLNESS
[de-identified] : Referred by: Dr. Lillian Barrett ([facility]) [phone] \par Diagnosis: R breast DCIS \par Here with daughter Keara ([phone]) who provided translation per patient request \par \par Ms. Colon is a post-menopausal female who presented at age 67 in 2022 for evaluation of R breast DCIS, ER/MN negative. \par The patient has a medical history of arthritis, HTN, HLD. \par \par Presenting HPI: Magui reports that she recently underwent a screening mammogram on 2022 which revealed calcifications in the right breast.  She goes for annual breast imaging and states that she had an abnormality once in the right breast ~7-8 years ago and underwent biopsy at that time which resulted as normal.  She subsequently underwent diagnostic mammogram and biopsy on 2022 at [place] which revealed DCIS, high-grade, ER 0%, MN 0%.  She has not yet been referred to breast surgery.\par \par At this time she states she is feeling well, she denies any palpable abnormality in the right breast.  She denies any breast pain or nipple discharge.  Her appetite and energy levels are good.\par \par Imaging: \par Diagnostic MMG 22- grouping of calcifications in the upper central right breast, recommend biopsy, BIRADS 4B\par \par Path: \par R breast biopsy 22- DCIS, solid, high grade, ER 0%, MN 0%\par \par Genetics: [facility] sent 22 \par \par SH: \par - Occupation: worked as a , now retired \par - Living situation: lives in [place], she has 2 grown children \par - Smoking/etoh/illicits: remote smoker, denies etoh, ainsley illicits \par - Exercise: minimally physically active \par \par OB/GYN Hx: \par - Age of menarche: 12\par - Age of menopause: 45\par - Pregnancy history:  \par - Age at live birth: 25\par - OCP use: 15 years \par - Breast feeding history: 8 months \par \par FH: \par - No family history of cancer  [de-identified] : Magui presents on 10/7/22 for follow up for R breast DCIS. \par \par ER/AR negative- seen by Dr. Powell on 9/16/22- pending RT with boost \par Saw Dr. Chaudhry on 9/8/22- she is now s/p R lumpectomy on 8/31/22 Surgical PATH: DCIS, Gr 3, at least 1.7 cm, Margins, <1 mm at superior, rest >2 mm. \par No plan for surgical re-excision \par Receptors repeated- remains ER/AR negative. \par \par At today's visit she is feeling well. She has an appointment for RT simulation on 10/12/22. Pending 4 weeks of treatment. She has healed well from surgery.  The patient denies any recent headaches, fevers or chills, chest pain, shortness of breath, abdominal pain, nausea, vomiting, diarrhea, lower extremity edema, weight loss or gain. They have a normal appetite and energy level.\par \par Health Care Maintenance: Updated 10/7/22\par Mammogram: Last done 7/2022 \par Colonoscopy: had one many years ago, DUE\par Pap smear: DUE \par Dentist: discuss at next \par PCP: Dr. Lillian Barrett ([facility]) [phone] \par Dermatology screen: discuss at next visit \par Genetic screen: [facility] genetics 8/11/22 - NEGATIVE \par DEXA: discuss at next visit \par \par Vaccinations: \par COVID vaccination: s/p 2 doses and 1 booster \par Flu vaccine: patient declines today \par

## 2022-10-11 ENCOUNTER — APPOINTMENT (OUTPATIENT)
Dept: OBGYN | Facility: CLINIC | Age: 67
End: 2022-10-11

## 2022-10-11 VITALS
HEIGHT: 62 IN | SYSTOLIC BLOOD PRESSURE: 134 MMHG | DIASTOLIC BLOOD PRESSURE: 78 MMHG | WEIGHT: 126 LBS | BODY MASS INDEX: 23.19 KG/M2

## 2022-10-11 DIAGNOSIS — Z01.419 ENCOUNTER FOR GYNECOLOGICAL EXAMINATION (GENERAL) (ROUTINE) W/OUT ABNORMAL FINDINGS: ICD-10-CM

## 2022-10-11 PROCEDURE — 99387 INIT PM E/M NEW PAT 65+ YRS: CPT

## 2022-10-11 NOTE — PHYSICAL EXAM
[Appropriately responsive] : appropriately responsive [Alert] : alert [No Acute Distress] : no acute distress [No Lymphadenopathy] : no lymphadenopathy [Regular Rate Rhythm] : regular rate rhythm [Soft] : soft [Non-tender] : non-tender [Non-distended] : non-distended [No HSM] : No HSM [No Lesions] : no lesions [No Mass] : no mass [Oriented x3] : oriented x3 [FreeTextEntry6] : right lumpectomy scar - healing well [Examination Of The Breasts] : a normal appearance [No Masses] : no breast masses were palpable [Labia Majora] : normal [Labia Minora] : normal [Normal] : normal [Uterine Adnexae] : normal

## 2022-10-11 NOTE — HISTORY OF PRESENT ILLNESS
[FreeTextEntry1] : SHEILA KUMAR is a 67 year F   here for annual. recently diagnosed with DCIS in right breast s/p lumpectomy, starting radiation this week. She is ER/MT negative - no tamoxifen per oncology. Genetics negative.\par Denies vaginal bleeding, discharge or pain. Sexually active - no issues, has not seen a gyn in 8 years.\par \par Gynhx:LMP 55 yrs old; h/o  Reg menses, No h/o STIs/cysts/abn paps; +h/o fibroids - no surgery\par Obhx:c/s x2\par \par Last mammo: 2022 - birads 4b\par Last Colonoscopy: not yet - is scheduling it\par Last Pap smear: 8 yars ago\par Last dexa:not yet\par

## 2022-10-11 NOTE — DISCUSSION/SUMMARY
[FreeTextEntry1] : 67 year old PMF here for wwe, h/o dcis s/p lumpectomy, for radiation, nl exam\par - pap/hpv sent - would continue as she has not seen a gyn in 8 years\par - sbe reveiwed, Mammo handled by breast surgeon\par - Dexa RX given, vitamin D and calcium, weight bearing exercises recommended\par - colonoscopy advised\par - n/v 1 year for annual or prn\par - postmenopausal bleeding precautions given\par

## 2022-10-13 LAB — HPV HIGH+LOW RISK DNA PNL CVX: NOT DETECTED

## 2022-10-18 LAB — CYTOLOGY CVX/VAG DOC THIN PREP: ABNORMAL

## 2022-11-07 ENCOUNTER — APPOINTMENT (OUTPATIENT)
Dept: BREAST CENTER | Facility: CLINIC | Age: 67
End: 2022-11-07

## 2022-11-07 VITALS
SYSTOLIC BLOOD PRESSURE: 106 MMHG | HEART RATE: 76 BPM | HEIGHT: 62 IN | TEMPERATURE: 97.3 F | WEIGHT: 126 LBS | DIASTOLIC BLOOD PRESSURE: 63 MMHG | BODY MASS INDEX: 23.19 KG/M2

## 2022-11-07 PROCEDURE — 99024 POSTOP FOLLOW-UP VISIT: CPT

## 2022-11-07 NOTE — HISTORY OF PRESENT ILLNESS
[FreeTextEntry1] : 68 y/o Tahir negative,  female here post op 8/31/22 R lumpectomy for DCIS, ER/VA negative. \par 8/31/22 Surgical PATH: DCIS, Gr 3, at least 1.7 cm, Margins, <1 mm at superior in area of 3 mm, rest >2 mm. \par \par Doing well postop, started radiation with Dr. Powell 10/10, finishes next week. She notes area is very sensitive s/p radiation. \par \par Referred by Dr. Sheba Levi\par \par Saw. Dr Powell 9/16/22, discussion about reexcision done and pt does not want reexcision (standard of care rec), she opted for EBRT with boost. \par \par PCP is Dr. Lillian Barrett.\par \par 7/22/22-NFI, bilat mmg heterogeneously dense, TC 6.9%, *grouping of calcifications in the upper central right breast, recommend biopsy, Left negative, BIRADS 4B\par 7/28/22- Robert, Right breast, DCIS, solid, high grade, ER 0%, [place] 0%\par 8/18/22 MRI L: no suspicious enhancement. R: 12:00 biopsy proven DCIS measuring 3.4cm from posterior to anterior, measuring up to 7mm in thickness. *More inferiorly at 9:00 position close to midline is 10mm nonmass like enhancement for which biopsy is recommended. 10mm T2 hyperintensity of the liver too small to accurately characterize.\par 8/26/22, R MRI bx 8-9:00 PATH: negative for malignancy, FA change, benign and concordant, rec surg/onc mgmt - MRI 1 year of bx lesion \par \par No Fhx of cancer.

## 2022-11-07 NOTE — ASSESSMENT
[FreeTextEntry1] : 68 y/o Tahir negative,  female here post op 8/31/22 R lumpectomy for DCIS, ER/VT negative. \par 8/31/22 Surgical PATH: DCIS, Gr 3, at least 1.7 cm, Margins, <1 mm at superior in area of 3 mm, rest >2 mm. \par Doing well postop, started radiation with Dr. Powell 10/10, finishes next week. She notes area is very sensitive s/p radiation. \par \par Referred by Dr. Sheba Levi.\par \par Saw. Dr Powell 9/16/22, discussion about reexcision done and pt does not want reexcision (standard of care rec), she opted for EBRT with boost. \par \par PCP is Dr. Lillian Barrett.\par \par 7/22/22-NFI, bilat mmg heterogeneously dense, TC 6.9%, *grouping of calcifications in the upper central right breast, recommend biopsy, Left negative, BIRADS 4B\par 7/28/22- Robert, Right breast, DCIS, solid, high grade, ER 0%, VT 0%\par 8/18/22 MRI L: no suspicious enhancement. R: 12:00 biopsy proven DCIS measuring 3.4cm from posterior to anterior, measuring up to 7mm in thickness. *More inferiorly at 9:00 position close to midline is 10mm nonmass like enhancement for which biopsy is recommended. 10mm T2 hyperintensity of the liver too small to accurately characterize.\par 8/26/22, R MRI bx 8-9:00 PATH: negative for malignancy, FA change, benign and concordant, rec surg/onc mgmt - MRI 1 year of bx lesion \par \par No Fhx of cancer. \par \par CBE: Right: 12:00 inc well healed and no hematoma or cellulitis. Makenna radiation changes. \par Continue radiation with Dr. Powell. Right mmg due 2/23, postop. \par

## 2022-11-09 ENCOUNTER — APPOINTMENT (OUTPATIENT)
Dept: RADIOLOGY | Facility: CLINIC | Age: 67
End: 2022-11-09

## 2022-11-09 PROCEDURE — 77085 DXA BONE DENSITY AXL VRT FX: CPT

## 2022-11-10 ENCOUNTER — NON-APPOINTMENT (OUTPATIENT)
Age: 67
End: 2022-11-10

## 2023-04-09 ENCOUNTER — OUTPATIENT (OUTPATIENT)
Dept: OUTPATIENT SERVICES | Facility: HOSPITAL | Age: 68
LOS: 1 days | End: 2023-04-09

## 2023-04-09 DIAGNOSIS — C50.919 MALIGNANT NEOPLASM OF UNSPECIFIED SITE OF UNSPECIFIED FEMALE BREAST: ICD-10-CM

## 2023-04-17 ENCOUNTER — APPOINTMENT (OUTPATIENT)
Dept: HEMATOLOGY ONCOLOGY | Facility: CLINIC | Age: 68
End: 2023-04-17
Payer: MEDICARE

## 2023-04-17 VITALS
TEMPERATURE: 97.8 F | SYSTOLIC BLOOD PRESSURE: 158 MMHG | HEIGHT: 62 IN | BODY MASS INDEX: 22.49 KG/M2 | HEART RATE: 73 BPM | WEIGHT: 122.2 LBS | OXYGEN SATURATION: 99 % | DIASTOLIC BLOOD PRESSURE: 73 MMHG

## 2023-04-17 PROCEDURE — 99214 OFFICE O/P EST MOD 30 MIN: CPT

## 2023-04-17 RX ORDER — ATORVASTATIN CALCIUM 40 MG/1
40 TABLET, FILM COATED ORAL DAILY
Qty: 90 | Refills: 3 | Status: DISCONTINUED | COMMUNITY
Start: 1900-01-01 | End: 2023-04-17

## 2023-04-17 RX ORDER — ADALIMUMAB 40MG/0.4ML
40 KIT SUBCUTANEOUS
Qty: 2 | Refills: 0 | Status: DISCONTINUED | COMMUNITY
Start: 2022-01-14 | End: 2023-04-17

## 2023-04-17 RX ORDER — LEVOCETIRIZINE DIHYDROCHLORIDE 5 MG/1
5 TABLET ORAL DAILY
Qty: 30 | Refills: 2 | Status: DISCONTINUED | COMMUNITY
Start: 2022-02-21 | End: 2023-04-17

## 2023-04-17 RX ORDER — ASPIRIN ENTERIC COATED TABLETS 81 MG 81 MG/1
81 TABLET, DELAYED RELEASE ORAL
Qty: 90 | Refills: 3 | Status: DISCONTINUED | COMMUNITY
Start: 2019-09-09 | End: 2023-04-17

## 2023-04-17 RX ORDER — ACETAMINOPHEN AND CODEINE PHOSPHATE 60; 300 MG/1; MG/1
300-60 TABLET ORAL EVERY 6 HOURS
Qty: 56 | Refills: 0 | Status: DISCONTINUED | COMMUNITY
Start: 2022-03-28 | End: 2023-04-17

## 2023-04-17 NOTE — RESULTS/DATA
[FreeTextEntry1] : Ms. Colon is a post-menopausal female who initially presented at age 67 in August 2022 for evaluation of R breast DCIS, ER/VT negative. \par The patient has a medical history of arthritis, HTN, HLD. \par \par R breast DCIS, ER/VT negative. S/p R lumpectomy on 8/31/22 Surgical PATH: DCIS, Gr 3, at least 1.7 cm, Margins, <1 mm at superior, rest >2 mm. \par No plan for surgical re-excision \par Receptors repeated- remains ER/VT negative. \par Completed RT with Dr. Powell. \par No indication for anti-estrogen therapy\par Given consult for R UE PT\par RTC 6 months - she has no evidence for recurrent disease\par

## 2023-04-17 NOTE — PHYSICAL EXAM
[Fully active, able to carry on all pre-disease performance without restriction] : Status 0 - Fully active, able to carry on all pre-disease performance without restriction [Normal] : affect appropriate [de-identified] : generally well appearing female, NAD  [de-identified] : s/p R breast lumpectomy with radiation, well healed, no palpable masses or lymphadenopathy bilaterally

## 2023-04-17 NOTE — HISTORY OF PRESENT ILLNESS
[de-identified] : Referred by: Dr. Lillian Barrett ([facility]) [phone] \par Diagnosis: R breast DCIS \par Initially presented with daughter Keara ([phone]) who provided translation per patient request \par \par Ms. Colon is a post-menopausal female who presented at age 67 in 2022 for evaluation of R breast DCIS, ER/VT negative. \par The patient has a medical history of arthritis, HTN, HLD. \par \par Presenting HPI: Magui reported that she recently underwent a screening mammogram on 2022 which revealed calcifications in the right breast.  She goes for annual breast imaging and states that she had an abnormality once in the right breast ~7-8 years ago and underwent biopsy at that time which resulted as normal.  She subsequently underwent diagnostic mammogram and biopsy on 2022 at [facility] which revealed DCIS, high-grade, ER 0%, VT 0%.  She has not yet been referred to breast surgery.\par \par She was feeling well, she denied any palpable abnormality in the right breast.  She denied any breast pain or nipple discharge.  Her appetite and energy levels were good.\par \par Imaging: \par Diagnostic MMG 22- grouping of calcifications in the upper central right breast, recommend biopsy, BIRADS 4B\par \par Path: \par R breast biopsy 22- DCIS, solid, high grade, ER 0%, VT 0%\par \par Genetics: [employer] sent 22 \par \par SH: \par - Occupation: worked as a , now retired \par - Living situation: lives in [place], she has 2 grown children \par - Smoking/etoh/illicits: remote smoker, denies etoh, ainsley illicits \par - Exercise: minimally physically active \par \par OB/GYN Hx: \par - Age of menarche: 12\par - Age of menopause: 45\par - Pregnancy history:  \par - Age at live birth: 25\par - OCP use: 15 years \par - Breast feeding history: 8 months \par \par FH: \par - No family history of cancer  [de-identified] : Magui presents on 4/17/23 for follow up for R breast DCIS. \par \par Saw Dr. Chaudhry on 9/8/22- she is now s/p R lumpectomy on 8/31/22 Surgical PATH: DCIS, Gr 3, at least 1.7 cm, Margins, <1 mm at superior, rest >2 mm. \par Did not have surgical re-excision \par Receptors repeated- remains ER/MS negative\par Completed RT with Dr. Powell\par \par Assisted today by  #[id number] (Dominguez)\par \par At today's visit she is feeling well.  Due for right mammogram as ordered by breast surgery. Notes tightness of right axilla and upper arm with extension. Otherwise no change in personal or family medical history since last visit.  \par \par Denies recent headaches, dizziness, visual changes, balance issues, CP, cough, SOB, n/v/d, constipation, breast changes or new bone pain.\par \par Health Care Maintenance: Updated 4/17/23\par Mammogram: Needs to schedule\par Colonoscopy: scheduled\par Pap smear:   10/11/22 - NILM (atrophic changes), HPV DNA neg\par Dentist: discuss at next visit\par PCP: Dr. Lillian Barrett ([facility]) [phone] \par Dermatology screen: discuss at next visit \par Genetic screen: Tahir genetics 8/11/22 - NEGATIVE \par DEXA:  11/10/22 - T-score -1.7 femoral neck - results discussed today\par \par Vaccinations: \par COVID vaccination: s/p 2 doses and 1 booster \par Flu vaccine: patient declines today \par

## 2023-05-23 ENCOUNTER — APPOINTMENT (OUTPATIENT)
Dept: ULTRASOUND IMAGING | Facility: CLINIC | Age: 68
End: 2023-05-23

## 2023-05-23 ENCOUNTER — APPOINTMENT (OUTPATIENT)
Dept: MAMMOGRAPHY | Facility: CLINIC | Age: 68
End: 2023-05-23
Payer: MEDICARE

## 2023-05-23 ENCOUNTER — RESULT REVIEW (OUTPATIENT)
Age: 68
End: 2023-05-23

## 2023-05-23 PROCEDURE — 77065 DX MAMMO INCL CAD UNI: CPT | Mod: RT

## 2023-05-23 PROCEDURE — G0279: CPT | Mod: RT

## 2023-06-01 ENCOUNTER — APPOINTMENT (OUTPATIENT)
Dept: BREAST CENTER | Facility: CLINIC | Age: 68
End: 2023-06-01
Payer: MEDICARE

## 2023-06-01 ENCOUNTER — RESULT REVIEW (OUTPATIENT)
Age: 68
End: 2023-06-01

## 2023-06-01 VITALS
DIASTOLIC BLOOD PRESSURE: 67 MMHG | HEART RATE: 77 BPM | HEIGHT: 62 IN | TEMPERATURE: 97 F | WEIGHT: 122 LBS | BODY MASS INDEX: 22.45 KG/M2 | SYSTOLIC BLOOD PRESSURE: 130 MMHG

## 2023-06-01 PROCEDURE — 99214 OFFICE O/P EST MOD 30 MIN: CPT

## 2023-06-01 NOTE — HISTORY OF PRESENT ILLNESS
[FreeTextEntry1] : PCP is Dr. Lillian Barrett.\par Referred by Dr. Sheba Levi.\par \par 67 y/o Tahir negative,  female here today for follow up for R 8-9:00 DCIS. She is s/p 8/31/22 R lumpectomy for DCIS, ER/MT negative. \par 8/31/22 Surgical PATH: DCIS, Gr 3, at least 1.7 cm, Margins, <1 mm at superior in area of 3 mm, rest >2 mm. \par \par Saw. Dr Powell 9/16/22, discussion about reexcision done and pt does not want reexcision (standard of care rec), she opted for EBRT with boost. \par Finished RT with Dr. Powell 11/2022.\par \par 7/22/22-NFI, bilat mmg heterogeneously dense, TC 6.9%, *grouping of calcifications in the upper central right breast, recommend biopsy, Left negative, BIRADS 4B\par 7/28/22- Robert, Right breast, DCIS, solid, high grade, ER 0%, MT 0%\par 8/18/22 MRI L: no suspicious enhancement. R: 12:00 biopsy proven DCIS measuring 3.4cm from posterior to anterior, measuring up to 7mm in thickness. *More inferiorly at 9:00 position close to midline is 10mm nonmass like enhancement for which biopsy is recommended. 10mm T2 hyperintensity of the liver too small to accurately characterize.\par 8/26/22, R MRI bx 8-9:00 PATH: negative for malignancy, FA change, benign and concordant, rec surg/onc mgmt - MRI 1 year of bx lesion \par \par 5/23/23 NFR, R dMMG: Extremely dense. Interval postlumpectomy/treatment change. No susp mass, microcals or other sign of malignancy is identified. Resume annual screening BR2\par \par No Fhx of cancer. \par

## 2023-06-01 NOTE — ASSESSMENT
[FreeTextEntry1] : PCP is Dr. Lillian Barrett.\par Referred by Dr. Sheba Levi.\par \par 69 y/o Tahir negative,  female here today for follow up for R 8-9:00 DCIS. She is s/p 8/31/22 R lumpectomy for DCIS, ER/NE negative. \par 8/31/22 Surgical PATH: DCIS, Gr 3, at least 1.7 cm, Margins, <1 mm at superior in area of 3 mm, rest >2 mm. \par \par Saw. Dr Powell 9/16/22, discussion about reexcision done and pt does not want reexcision (standard of care rec), she opted for EBRT with boost. \par Finished RT with Dr. Powell 11/2022.\par \par 7/22/22-NFI, bilat mmg heterogeneously dense, TC 6.9%, *grouping of calcifications in the upper central right breast, recommend biopsy, Left negative, BIRADS 4B\par 7/28/22- Robert, Right breast, DCIS, solid, high grade, ER 0%, NE 0%\par 8/18/22 MRI L: no suspicious enhancement. R: 12:00 biopsy proven DCIS measuring 3.4cm from posterior to anterior, measuring up to 7mm in thickness. *More inferiorly at 9:00 position close to midline is 10mm nonmass like enhancement for which biopsy is recommended. 10mm T2 hyperintensity of the liver too small to accurately characterize.\par 8/26/22, R MRI bx 8-9:00 PATH: negative for malignancy, FA change, benign and concordant, rec surg/onc mgmt - MRI 1 year of bx lesion \par \par 5/23/23 NFR, R dMMG: Extremely dense. Interval postlumpectomy/treatment change. No susp mass, microcals or other sign of malignancy is identified. Resume annual screening BR2\par \par No Fhx of cancer. \par \par CBE: Right: 12:00 inc well healed and no hematoma or cellulitis. Post radiation changes. No axillary or SC lymphadenopathy. \par Reviewed recent mmg of the right with pt, BR2. Pt due for MRI f.u in August, however she only had right recent mmg and left mmg is due in July. Rec get both in August. If negative, can get Bilat mmg and u/s Jan/Feb 2024.\par

## 2023-06-01 NOTE — CONSULT LETTER
[Dear  ___] : Dear  [unfilled], [Courtesy Letter:] : I had the pleasure of seeing your patient, [unfilled], in my office today. [Consult Closing:] : Thank you very much for allowing me to participate in the care of this patient.  If you have any questions, please do not hesitate to contact me. [Sincerely,] : Sincerely, [FreeTextEntry3] : Tabitha Chaudhry MD

## 2023-06-01 NOTE — PHYSICAL EXAM
[Normocephalic] : normocephalic [Atraumatic] : atraumatic [Supple] : supple [No Supraclavicular Adenopathy] : no supraclavicular adenopathy [Examined in the supine and seated position] : examined in the supine and seated position [No dominant masses] : no dominant masses in right breast  [No dominant masses] : no dominant masses left breast [No Nipple Retraction] : no left nipple retraction [No Nipple Discharge] : no left nipple discharge [No Axillary Lymphadenopathy] : no left axillary lymphadenopathy [No Edema] : no edema [No Swelling] : no swelling [Full ROM] : full range of motion [No Rashes] : no rashes [No Ulceration] : no ulceration [de-identified] : post radiation changes, upper well healed scar.

## 2023-08-08 ENCOUNTER — OFFICE (OUTPATIENT)
Dept: URBAN - METROPOLITAN AREA CLINIC 38 | Facility: CLINIC | Age: 68
Setting detail: OPHTHALMOLOGY
End: 2023-08-08
Payer: COMMERCIAL

## 2023-08-08 DIAGNOSIS — H52.4: ICD-10-CM

## 2023-08-08 DIAGNOSIS — H43.813: ICD-10-CM

## 2023-08-08 DIAGNOSIS — H35.031: ICD-10-CM

## 2023-08-08 DIAGNOSIS — H35.373: ICD-10-CM

## 2023-08-08 DIAGNOSIS — H31.001: ICD-10-CM

## 2023-08-08 DIAGNOSIS — Q14.8: ICD-10-CM

## 2023-08-08 PROBLEM — H33.322 ROUND HOLE RETINA WITHOUT DETACHMENT; LEFT EYE: Status: ACTIVE | Noted: 2023-08-08

## 2023-08-08 PROCEDURE — 92014 COMPRE OPH EXAM EST PT 1/>: CPT | Performed by: OPHTHALMOLOGY

## 2023-08-08 PROCEDURE — 92015 DETERMINE REFRACTIVE STATE: CPT | Performed by: OPHTHALMOLOGY

## 2023-08-08 PROCEDURE — 92201 OPSCPY EXTND RTA DRAW UNI/BI: CPT | Performed by: OPHTHALMOLOGY

## 2023-08-08 ASSESSMENT — AXIALLENGTH_DERIVED
OD_AL: 22.953
OS_AL: 22.9148
OS_AL: 22.9148
OD_AL: 22.953
OD_AL: 22.8611
OS_AL: 22.7778

## 2023-08-08 ASSESSMENT — SPHEQUIV_DERIVED
OD_SPHEQUIV: 0.875
OS_SPHEQUIV: 0.375
OD_SPHEQUIV: 0.625
OS_SPHEQUIV: 0.375
OD_SPHEQUIV: 0.625
OS_SPHEQUIV: 0.75

## 2023-08-08 ASSESSMENT — REFRACTION_CURRENTRX
OS_AXIS: 104
OS_SPHERE: +3.25
OS_CYLINDER: -1.25
OD_SPHERE: +3.25
OD_AXIS: 083
OD_CYLINDER: -1.00
OD_VPRISM_DIRECTION: SV
OS_VPRISM_DIRECTION: SV
OD_OVR_VA: 20/
OS_OVR_VA: 20/

## 2023-08-08 ASSESSMENT — REFRACTION_MANIFEST
OU_VA: 20/20-1
OS_AXIS: 090
OS_ADD: +2.75
OD_CYLINDER: -1.25
OU_VA: 20/20-1
OS_CYLINDER: -0.75
OD_VA1: 20/25
OD_ADD: +2.75
OD_SPHERE: +1.25
OD_VA2: 20/20(J1+)
OS_CYLINDER: -0.75
OD_AXIS: 085
OD_CYLINDER: -1.25
OD_SPHERE: +1.25
OD_AXIS: 085
OS_AXIS: 090
OS_SPHERE: +0.75
OD_VA1: 20/25
OS_VA2: 20/20(J1+)
OD_VA2: 20/20(J1+)
OS_SPHERE: +0.75
OS_VA2: 20/20(J1+)
OS_VA1: 20/25
OS_VA1: 20/25

## 2023-08-08 ASSESSMENT — REFRACTION_AUTOREFRACTION
OS_CYLINDER: -2.00
OD_AXIS: 083
OS_AXIS: 089
OS_SPHERE: +1.75
OD_SPHERE: +1.75
OD_CYLINDER: -1.75

## 2023-08-08 ASSESSMENT — CONFRONTATIONAL VISUAL FIELD TEST (CVF)
OD_FINDINGS: FULL
OS_FINDINGS: FULL

## 2023-08-08 ASSESSMENT — TONOMETRY
OD_IOP_MMHG: 13
OS_IOP_MMHG: 12

## 2023-08-08 ASSESSMENT — KERATOMETRY
OS_K1POWER_DIOPTERS: 45.00
OD_K2POWER_DIOPTERS: 44.75
OS_K2POWER_DIOPTERS: 45.00
OS_AXISANGLE_DEGREES: 090
OD_AXISANGLE_DEGREES: 179
OD_K1POWER_DIOPTERS: 44.50
METHOD_AUTO_MANUAL: AUTO

## 2023-08-08 ASSESSMENT — TEAR BREAK UP TIME (TBUT)
OD_TBUT: 8 SECS
OS_TBUT: 8 SECS

## 2023-08-08 ASSESSMENT — VISUAL ACUITY
OS_BCVA: 20/30-2
OD_BCVA: 20/40-2

## 2023-08-08 ASSESSMENT — SUPERFICIAL PUNCTATE KERATITIS (SPK)
OD_SPK: T 1+
OS_SPK: 1+

## 2023-08-11 ENCOUNTER — APPOINTMENT (OUTPATIENT)
Dept: MAMMOGRAPHY | Facility: CLINIC | Age: 68
End: 2023-08-11
Payer: MEDICARE

## 2023-08-11 ENCOUNTER — RESULT REVIEW (OUTPATIENT)
Age: 68
End: 2023-08-11

## 2023-08-11 ENCOUNTER — APPOINTMENT (OUTPATIENT)
Dept: MRI IMAGING | Facility: CLINIC | Age: 68
End: 2023-08-11

## 2023-08-11 PROCEDURE — G0279: CPT

## 2023-08-11 PROCEDURE — A9585: CPT | Mod: JW

## 2023-08-11 PROCEDURE — 77066 DX MAMMO INCL CAD BI: CPT

## 2023-08-11 PROCEDURE — 77049 MRI BREAST C-+ W/CAD BI: CPT

## 2023-08-16 ENCOUNTER — NON-APPOINTMENT (OUTPATIENT)
Age: 68
End: 2023-08-16

## 2023-08-25 ENCOUNTER — NON-APPOINTMENT (OUTPATIENT)
Age: 68
End: 2023-08-25

## 2023-09-01 ENCOUNTER — NON-APPOINTMENT (OUTPATIENT)
Age: 68
End: 2023-09-01

## 2023-10-10 ENCOUNTER — OUTPATIENT (OUTPATIENT)
Dept: OUTPATIENT SERVICES | Facility: HOSPITAL | Age: 68
LOS: 1 days | End: 2023-10-10

## 2023-10-10 DIAGNOSIS — C50.919 MALIGNANT NEOPLASM OF UNSPECIFIED SITE OF UNSPECIFIED FEMALE BREAST: ICD-10-CM

## 2023-10-16 ENCOUNTER — APPOINTMENT (OUTPATIENT)
Dept: HEMATOLOGY ONCOLOGY | Facility: CLINIC | Age: 68
End: 2023-10-16

## 2023-12-04 ENCOUNTER — APPOINTMENT (OUTPATIENT)
Dept: BREAST CENTER | Facility: CLINIC | Age: 68
End: 2023-12-04
Payer: MEDICARE

## 2023-12-04 VITALS
HEIGHT: 62 IN | SYSTOLIC BLOOD PRESSURE: 133 MMHG | HEART RATE: 62 BPM | BODY MASS INDEX: 22.08 KG/M2 | TEMPERATURE: 96.5 F | WEIGHT: 120 LBS | DIASTOLIC BLOOD PRESSURE: 66 MMHG

## 2023-12-04 DIAGNOSIS — R93.89 ABNORMAL FINDINGS ON DIAGNOSTIC IMAGING OF OTHER SPECIFIED BODY STRUCTURES: ICD-10-CM

## 2023-12-04 DIAGNOSIS — Z78.9 OTHER SPECIFIED HEALTH STATUS: ICD-10-CM

## 2023-12-04 PROCEDURE — 99214 OFFICE O/P EST MOD 30 MIN: CPT

## 2024-01-26 ENCOUNTER — NON-APPOINTMENT (OUTPATIENT)
Age: 69
End: 2024-01-26

## 2024-03-11 ENCOUNTER — APPOINTMENT (OUTPATIENT)
Dept: BREAST CENTER | Facility: CLINIC | Age: 69
End: 2024-03-11
Payer: MEDICARE

## 2024-03-11 VITALS
HEART RATE: 70 BPM | DIASTOLIC BLOOD PRESSURE: 70 MMHG | SYSTOLIC BLOOD PRESSURE: 128 MMHG | OXYGEN SATURATION: 98 % | BODY MASS INDEX: 22.35 KG/M2 | WEIGHT: 121.44 LBS | HEIGHT: 62 IN | TEMPERATURE: 97.3 F

## 2024-03-11 PROCEDURE — 99214 OFFICE O/P EST MOD 30 MIN: CPT

## 2024-03-11 NOTE — ASSESSMENT
[FreeTextEntry1] : PCP is Dr. Thai Lee, DO Referred by Dr. Sheba Levi.  69 y/o Tahir negative,  female here today for follow up for R 8-9:00 DCIS. She is s/p 8/31/22 R lumpectomy for DCIS, ER/ND negative. 8/31/22 Surgical PATH: DCIS, Gr 3, at least 1.7 cm, Margins, <1 mm at superior in area of 3 mm, rest >2 mm.  Sees Dr Powell 9/16/22, discussion about reexcision done and pt did not want reexcision (standard of care rec), she opted for EBRT with boost. Finished RT with Dr. Powell 11/2022. She was to f.u with Dr. Levi but never did nor had the CT chest per PCP. She recently changed PCP's.  Was due for mmg and US last month and pt did not get it.   8/23 MRI with lung nodule, pt was called to review results and messages left but she patient never called back. Certified letter was also mailed 9/23 7/28/22- Robert, Right breast, DCIS, solid, high grade, ER 0%, ND 0%  8/26/22, R MRI bx 8-9:00 PATH: negative for malignancy, FA change, benign and concordant, rec surg/onc mgmt - MRI 1 year of bx lesion  5/23/23 NFR, R dMMG: Extremely dense. Interval postlumpectomy/treatment change. No susp mass, microcals or other sign of malignancy is identified. Resume annual screening BR2  8/11/23 NFR, bilat dMMG: Extremely dense. No susp mass, microcals or other sign of malignancy is identified. Stable postsurgical changes right breast. Rec mmg in 1yr. BR2  8/11/23 NFR, MRI: R- Postop changes and seroma associated with no suspicious enhancement in the right central breast. Enhancement of the right pectoralis muscle is consistent with postop changes which were also demonstrated on the recent mmg. L- no susp enhancement. Axilla/Other: No significant axillary or internal mammary lymphadenopathy. Stable costophrenic LNs. Stable T2 bright subcentimeter right lobe hepatic nodule. New 4 mm subpleural lung nodule. Impression: No MRI evidence of breast malignancy. Incidentally noted is a 4 mm subpleural right lung nodule for which further evaluation with CT scan of the chest is suggested. Recommend: Resume annual mmg on schedule BR2. MRI was done as f.u since she had benign biopsy MRI 8/26/23. Pt was a smoker in the past.  No Fhx of cancer.  CBE: Right: 12:00 inc well healed and no hematoma or cellulitis. Post radiation changes. No axillary or SC lymphadenopathy. Left negative. No axillary or SC lymphadenopathy. Pt still did not get her CT yet. She also changed PCP and today say her and told her she did not need any studies. WIll order the CT today but discussed further f.u chest CT should be with PCP. Pt also never followed up with Dr. Levi, she is DCIS HR negative but they had not discharged. Also rec appt with DR. Powell for F.u. SHe is also overdue for her mmg and u/s.    PLAN: CT chest regarding lung nodule on MRI.  Bilat mgm and u/s due now. F/u appt with Ney Levi and Andre. F.u 6 mos.

## 2024-03-11 NOTE — PHYSICAL EXAM
[Atraumatic] : atraumatic [Normocephalic] : normocephalic [Supple] : supple [No Thyromegaly] : no thyromegaly [No Supraclavicular Adenopathy] : no supraclavicular adenopathy [Symmetrical] : symmetrical [Examined in the supine and seated position] : examined in the supine and seated position [No dominant masses] : no dominant masses in right breast  [No dominant masses] : no dominant masses left breast [No Nipple Retraction] : no right nipple retraction [No Nipple Discharge] : no left nipple discharge [No Axillary Lymphadenopathy] : no right axillary lymphadenopathy [No Edema] : no edema [No Swelling] : no swelling [Full ROM] : full range of motion [No Rashes] : no rashes [No Ulceration] : no ulceration [de-identified] : UOQ incision [TextEntry] : Psych: Appropriate, NAD, A&Ox3 Neuro: Intact grossly, gait normal

## 2024-03-11 NOTE — HISTORY OF PRESENT ILLNESS
[FreeTextEntry1] : PCP is Dr. Thai Lee, DO Referred by Dr. Sheba Levi.  67 y/o Tahir negative,  female here today for follow up for R 8-9:00 DCIS. She is s/p 8/31/22 R lumpectomy for DCIS, ER/LA negative. 8/31/22 Surgical PATH: DCIS, Gr 3, at least 1.7 cm, Margins, <1 mm at superior in area of 3 mm, rest >2 mm.  Sees Dr Powell 9/16/22, discussion about reexcision done and pt did not want reexcision (standard of care rec), she opted for EBRT with boost. Finished RT with Dr. Powell 11/2022. She was to f.u with Dr. Levi but never did nor had the CT chest per PCP. She recently changed PCP's.  Was due for mmg and US last month and pt did not get it.   8/23 MRI with lung nodule, pt was called to review results and messages left but she patient never called back. Certified letter was also mailed 9/23 7/28/22- Robert, Right breast, DCIS, solid, high grade, ER 0%, LA 0%  8/26/22, R MRI bx 8-9:00 PATH: negative for malignancy, FA change, benign and concordant, rec surg/onc mgmt - MRI 1 year of bx lesion  5/23/23 NFR, R dMMG: Extremely dense. Interval postlumpectomy/treatment change. No susp mass, microcals or other sign of malignancy is identified. Resume annual screening BR2  8/11/23 NFR, bilat dMMG: Extremely dense. No susp mass, microcals or other sign of malignancy is identified. Stable postsurgical changes right breast. Rec mmg in 1yr. BR2  8/11/23 NFR, MRI: R- Postop changes and seroma associated with no suspicious enhancement in the right central breast. Enhancement of the right pectoralis muscle is consistent with postop changes which were also demonstrated on the recent mmg. L- no susp enhancement. Axilla/Other: No significant axillary or internal mammary lymphadenopathy. Stable costophrenic LNs. Stable T2 bright subcentimeter right lobe hepatic nodule. New 4 mm subpleural lung nodule. Impression: No MRI evidence of breast malignancy. Incidentally noted is a 4 mm subpleural right lung nodule for which further evaluation with CT scan of the chest is suggested. Recommend: Resume annual mmg on schedule BR2. MRI was done as f.u since she had benign biopsy MRI 8/26/23. Pt was a smoker in the past.  No Fhx of cancer.

## 2024-03-11 NOTE — PAST MEDICAL HISTORY
[Postmenopausal] : The patient is postmenopausal [Menarche Age ____] : age at menarche was [unfilled] [Menopause Age____] : age at menopause was [unfilled] [Living ___] : Living: [unfilled] [Total Preg ___] : G[unfilled] [Age At Live Birth ___] : Age at live birth: [unfilled] [FreeTextEntry7] : 15 years [FreeTextEntry8] : 8 months

## 2024-03-12 ENCOUNTER — OUTPATIENT (OUTPATIENT)
Dept: OUTPATIENT SERVICES | Facility: HOSPITAL | Age: 69
LOS: 1 days | End: 2024-03-12

## 2024-03-12 DIAGNOSIS — C50.919 MALIGNANT NEOPLASM OF UNSPECIFIED SITE OF UNSPECIFIED FEMALE BREAST: ICD-10-CM

## 2024-03-12 DIAGNOSIS — D64.9 ANEMIA, UNSPECIFIED: ICD-10-CM

## 2024-03-14 ENCOUNTER — APPOINTMENT (OUTPATIENT)
Dept: HEMATOLOGY ONCOLOGY | Facility: CLINIC | Age: 69
End: 2024-03-14
Payer: MEDICARE

## 2024-03-14 VITALS
WEIGHT: 123.8 LBS | BODY MASS INDEX: 22.78 KG/M2 | DIASTOLIC BLOOD PRESSURE: 71 MMHG | TEMPERATURE: 97.9 F | HEART RATE: 63 BPM | SYSTOLIC BLOOD PRESSURE: 114 MMHG | OXYGEN SATURATION: 99 % | HEIGHT: 62 IN

## 2024-03-14 PROCEDURE — 99214 OFFICE O/P EST MOD 30 MIN: CPT

## 2024-03-14 NOTE — PHYSICAL EXAM
[Fully active, able to carry on all pre-disease performance without restriction] : Status 0 - Fully active, able to carry on all pre-disease performance without restriction [Normal] : affect appropriate [de-identified] : generally well appearing female, NAD  [de-identified] : s/p R breast lumpectomy with radiation, well healed, no palpable masses or lymphadenopathy bilaterally

## 2024-03-14 NOTE — HISTORY OF PRESENT ILLNESS
[de-identified] : Magui presents on 3/14/2024 for follow up for R breast DCIS.  S/p R lumpectomy on 8/31/22 Surgical PATH: DCIS, Gr 3, at least 1.7 cm, Margins, <1 mm at superior, rest >2 mm.  Did not have surgical re-excision  Receptors repeated- remains ER/RI negative Completed RT with Dr. Andre Cortes  today.  At today's visit, Magui reports feeling very well.  Recently returned from visiting [facility]. She continues to follow periodically w/ observation alone for history of hormone negative DCIS.  Her R UE ROM has improved significantly after completing PT.  Saw Dr. Chaudhry last month for routine follow up.  Otherwise no change in personal or family medical history since last visit.   She denies recent breast changes, headaches, dizziness, visual changes, balance issues, CP, cough, SOB, n/v/d, constipation, breast changes or new bone pain.  Health Care Maintenance: Updated 3/14/24 Mammogram:  8/11/23 - Birads 2 - has scripts to repeat Colonoscopy: 2023 - no findings per pt Pap smear:   10/11/22 - NILM (atrophic changes), HPV DNA neg PCP: Dr. Lillian Barrett ([facility]) [phone]  Genetic screen: [employer] 8/11/22 - NEGATIVE  DEXA:  11/10/22 - T-score -1.7 femoral neck - results discussed today  Vaccinations:  COVID vaccination: s/p 2 doses and 1 booster  Flu vaccine: patient declines today   [de-identified] : Referred by: Dr. Lillian Barrett ([facility]) [phone] \par  Diagnosis: R breast DCIS \par  Initially presented with daughter Keara ([phone]) who provided translation per patient request \par  \par  Ms. Colon is a post-menopausal female who presented at age 67 in 2022 for evaluation of R breast DCIS, ER/NM negative. \par  The patient has a medical history of arthritis, HTN, HLD. \par  \par  Presenting HPI: Magui reported that she recently underwent a screening mammogram on 2022 which revealed calcifications in the right breast.  She goes for annual breast imaging and states that she had an abnormality once in the right breast ~7-8 years ago and underwent biopsy at that time which resulted as normal.  She subsequently underwent diagnostic mammogram and biopsy on 2022 at [facility] which revealed DCIS, high-grade, ER 0%, NM 0%.  She has not yet been referred to breast surgery.\par  \par  She was feeling well, she denied any palpable abnormality in the right breast.  She denied any breast pain or nipple discharge.  Her appetite and energy levels were good.\par  \par  Imaging: \par  Diagnostic MMG 22- grouping of calcifications in the upper central right breast, recommend biopsy, BIRADS 4B\par  \par  Path: \par  R breast biopsy 22- DCIS, solid, high grade, ER 0%, NM 0%\par  \par  Genetics: [employer] sent 22 \par  \par  SH: \par  - Occupation: worked as a , now retired \par  - Living situation: lives in [place], she has 2 grown children \par  - Smoking/etoh/illicits: remote smoker, denies etoh, ainsley illicits \par  - Exercise: minimally physically active \par  \par  OB/GYN Hx: \par  - Age of menarche: 12\par  - Age of menopause: 45\par  - Pregnancy history:  \par  - Age at live birth: 25\par  - OCP use: 15 years \par  - Breast feeding history: 8 months \par  \par  FH: \par  - No family history of cancer

## 2024-03-14 NOTE — RESULTS/DATA
[FreeTextEntry1] : Ms. Colon is a post-menopausal female who initially presented at age 67 in August 2022 for evaluation of R breast DCIS, ER/MO negative.  The patient has a medical history of arthritis, HTN, HLD.   R breast DCIS, ER/MO negative. S/p R lumpectomy on 8/31/22 Surgical PATH: DCIS, Gr 3, at least 1.7 cm, Margins, <1 mm at superior, rest >2 mm.  No plan for surgical re-excision  Receptors repeated- remains ER/MO negative.  Completed RT with Dr. Powell.  No indication for anti-estrogen therapy Completed PT w/ notable improvement in R UE ROM She has no evidence for recurrent disease Will see Dr. Chaudhry in 9/2024 (has scripts to repeat routine imaging) Next routine office visit w/ Dr. Levi in 3/2025, sooner prn

## 2024-04-04 ENCOUNTER — RESULT REVIEW (OUTPATIENT)
Age: 69
End: 2024-04-04

## 2024-04-04 ENCOUNTER — NON-APPOINTMENT (OUTPATIENT)
Age: 69
End: 2024-04-04

## 2024-04-04 ENCOUNTER — APPOINTMENT (OUTPATIENT)
Dept: ULTRASOUND IMAGING | Facility: CLINIC | Age: 69
End: 2024-04-04

## 2024-04-04 ENCOUNTER — APPOINTMENT (OUTPATIENT)
Dept: CARDIOLOGY | Facility: CLINIC | Age: 69
End: 2024-04-04
Payer: MEDICARE

## 2024-04-04 ENCOUNTER — APPOINTMENT (OUTPATIENT)
Dept: CT IMAGING | Facility: CLINIC | Age: 69
End: 2024-04-04
Payer: MEDICARE

## 2024-04-04 ENCOUNTER — APPOINTMENT (OUTPATIENT)
Dept: MAMMOGRAPHY | Facility: CLINIC | Age: 69
End: 2024-04-04

## 2024-04-04 VITALS
HEIGHT: 62 IN | SYSTOLIC BLOOD PRESSURE: 132 MMHG | BODY MASS INDEX: 22.82 KG/M2 | DIASTOLIC BLOOD PRESSURE: 70 MMHG | HEART RATE: 60 BPM | WEIGHT: 124 LBS | OXYGEN SATURATION: 97 %

## 2024-04-04 DIAGNOSIS — R94.31 ABNORMAL ELECTROCARDIOGRAM [ECG] [EKG]: ICD-10-CM

## 2024-04-04 DIAGNOSIS — E78.5 HYPERLIPIDEMIA, UNSPECIFIED: ICD-10-CM

## 2024-04-04 DIAGNOSIS — Z01.810 ENCOUNTER FOR PREPROCEDURAL CARDIOVASCULAR EXAMINATION: ICD-10-CM

## 2024-04-04 DIAGNOSIS — Z79.899 OTHER LONG TERM (CURRENT) DRUG THERAPY: ICD-10-CM

## 2024-04-04 DIAGNOSIS — I10 ESSENTIAL (PRIMARY) HYPERTENSION: ICD-10-CM

## 2024-04-04 PROCEDURE — G0279: CPT

## 2024-04-04 PROCEDURE — 93000 ELECTROCARDIOGRAM COMPLETE: CPT

## 2024-04-04 PROCEDURE — 71250 CT THORAX DX C-: CPT

## 2024-04-04 PROCEDURE — 77066 DX MAMMO INCL CAD BI: CPT

## 2024-04-04 PROCEDURE — 76641 ULTRASOUND BREAST COMPLETE: CPT | Mod: 50

## 2024-04-04 PROCEDURE — 99215 OFFICE O/P EST HI 40 MIN: CPT

## 2024-04-04 PROCEDURE — G2211 COMPLEX E/M VISIT ADD ON: CPT

## 2024-04-08 ENCOUNTER — NON-APPOINTMENT (OUTPATIENT)
Age: 69
End: 2024-04-08

## 2024-04-08 ENCOUNTER — APPOINTMENT (OUTPATIENT)
Dept: CARDIOLOGY | Facility: CLINIC | Age: 69
End: 2024-04-08
Payer: MEDICARE

## 2024-04-08 PROCEDURE — 93306 TTE W/DOPPLER COMPLETE: CPT

## 2024-04-08 PROCEDURE — 76376 3D RENDER W/INTRP POSTPROCES: CPT

## 2024-04-08 NOTE — HISTORY OF PRESENT ILLNESS
[FreeTextEntry1] : Historical Perspective: 69 year old female with history of HTN, HLD, DCIS s/p lumpectomy and radiation 2022, previous chest pain episode presents for a cardiac evaluation because of uncontrolled HTN. Patient states that the lowest the BP is at home is in the 150s systolic.   There is no history of MI, CVA, CHF, or previous coronary intervention.  Current Health Status: Patient with no chest pain, SOB, or palpitations. No hospitalizations since seeing me last. Remains compliant with his medications and reports no adverse effects. She will be having bilateral knee replacement with Dr. Trejo, 4/24/2024

## 2024-04-08 NOTE — ADDENDUM
[FreeTextEntry1] : Patient underwent echocardiogram in our office, 4/8/2024. Normal LV systolic function and no significant valvular disease. Patient is optimized from a cardiology standpoint to proceed with orthopedic surgery.

## 2024-04-08 NOTE — PHYSICAL EXAM
[Normal] : moves all extremities, no focal deficits, normal speech [de-identified] : No JVD, no carotid artery bruits auscultated bilaterally

## 2024-04-08 NOTE — DISCUSSION/SUMMARY
[EKG obtained to assist in diagnosis and management of assessed problem(s)] : EKG obtained to assist in diagnosis and management of assessed problem(s) [FreeTextEntry1] : 1. Atypical Chest Pain: resolved. Pharmacologic nuclear stress testing normal in 2018.  2. HTN: controlled. Recommend continuing Labetalol 200mg BID (high risk medication with no signs of toxicity). Continue HCTZ 25mg daily. Recommend continuing amlodiopine-Valsartan 10/320mg daily. Low salt diet. Goal BP less than 130/80.  3. HLD: continue atorvastatin 40mg daily  4. Abnormal ECG: T wave inversions stable. Normal pharmacologic nuclear stress testing in 2016. Normal echocardiogram 5/5/2021.  Recommend preoperative echocardiogram. Once reviewed, I can make further recommendations regarding the perioperative care of this patient.  Follow up in 12 months.

## 2024-04-08 NOTE — CARDIOLOGY SUMMARY
[de-identified] : 4/4/2024, NSR with non-specific T wave changes. 11/18/2021, NSR with iRBBB, T wave inversions.  [de-identified] : 10/23/2018, Pharmacologic Nuclear Stress Testing: no ischemia or evidence of prior infarction noted on SPECT images [de-identified] : 5/5/2021, LVEF 65%, mild MR, mildly dilated LA, trace TR with estimated PASP 16mmHg.

## 2024-04-11 ENCOUNTER — NON-APPOINTMENT (OUTPATIENT)
Age: 69
End: 2024-04-11

## 2024-04-26 ENCOUNTER — TRANSCRIPTION ENCOUNTER (OUTPATIENT)
Age: 69
End: 2024-04-26

## 2024-04-28 ENCOUNTER — TRANSCRIPTION ENCOUNTER (OUTPATIENT)
Age: 69
End: 2024-04-28

## 2024-04-30 ENCOUNTER — TRANSCRIPTION ENCOUNTER (OUTPATIENT)
Age: 69
End: 2024-04-30

## 2024-05-14 ENCOUNTER — OUTPATIENT (OUTPATIENT)
Dept: OUTPATIENT SERVICES | Facility: HOSPITAL | Age: 69
LOS: 1 days | End: 2024-05-14

## 2024-05-14 DIAGNOSIS — C50.919 MALIGNANT NEOPLASM OF UNSPECIFIED SITE OF UNSPECIFIED FEMALE BREAST: ICD-10-CM

## 2024-05-20 ENCOUNTER — APPOINTMENT (OUTPATIENT)
Dept: HEMATOLOGY ONCOLOGY | Facility: CLINIC | Age: 69
End: 2024-05-20
Payer: MEDICARE

## 2024-05-20 VITALS
HEART RATE: 74 BPM | OXYGEN SATURATION: 99 % | SYSTOLIC BLOOD PRESSURE: 124 MMHG | WEIGHT: 126 LBS | BODY MASS INDEX: 23.05 KG/M2 | RESPIRATION RATE: 16 BRPM | TEMPERATURE: 97.3 F | DIASTOLIC BLOOD PRESSURE: 67 MMHG

## 2024-05-20 DIAGNOSIS — D05.11 INTRADUCTAL CARCINOMA IN SITU OF RIGHT BREAST: ICD-10-CM

## 2024-05-20 DIAGNOSIS — R91.1 SOLITARY PULMONARY NODULE: ICD-10-CM

## 2024-05-20 PROCEDURE — G2211 COMPLEX E/M VISIT ADD ON: CPT

## 2024-05-20 PROCEDURE — 99214 OFFICE O/P EST MOD 30 MIN: CPT

## 2024-05-20 NOTE — HISTORY OF PRESENT ILLNESS
[de-identified] : Referred by: Dr. Lillian Barrett ([facility]) [phone] \par  Diagnosis: R breast DCIS \par  Here with daughter Keara ([phone]) who provided translation per patient request \par  \par  Ms. Colon is a post-menopausal female who presented at age 67 in 2022 for evaluation of R breast DCIS, ER/NM negative. \par  The patient has a medical history of arthritis, HTN, HLD. \par  \par  Presenting HPI: Magui reports that she recently underwent a screening mammogram on 2022 which revealed calcifications in the right breast.  She goes for annual breast imaging and states that she had an abnormality once in the right breast ~7-8 years ago and underwent biopsy at that time which resulted as normal.  She subsequently underwent diagnostic mammogram and biopsy on 2022 at [place] which revealed DCIS, high-grade, ER 0%, NM 0%.  She has not yet been referred to breast surgery.\par  \par  At this time she states she is feeling well, she denies any palpable abnormality in the right breast.  She denies any breast pain or nipple discharge.  Her appetite and energy levels are good.\par  \par  Imaging: \par  Diagnostic MMG 22- grouping of calcifications in the upper central right breast, recommend biopsy, BIRADS 4B\par  \par  Path: \par  R breast biopsy 22- DCIS, solid, high grade, ER 0%, NM 0%\par  \par  Genetics: [employer] sent 22 \par  \par  SH: \par  - Occupation: worked as a , now retired \par  - Living situation: lives in [place], she has 2 grown children \par  - Smoking/etoh/illicits: remote smoker, denies etoh, ainsley illicits \par  - Exercise: minimally physically active \par  \par  OB/GYN Hx: \par  - Age of menarche: 12\par  - Age of menopause: 45\par  - Pregnancy history:  \par  - Age at live birth: 25\par  - OCP use: 15 years \par  - Breast feeding history: 8 months \par  \par  FH: \par  - No family history of cancer  [de-identified] : Magui presents on 5/20/24 for follow up for R breast DCIS.  s/p R lumpectomy on 8/31/22 Surgical PATH: DCIS, Gr 3, at least 1.7 cm, ER/DC negative Completed RT w/ Dr. Powell   At today's visit she is generally feeling well. She had bilateral knee replacement in April 2024. She is doing physical therapy.  Using oxycodone and tylenol for pain which is well controlled.  CT lung results, 4/4/24: R upper lobe 0.4cm groundglass pulmonary nodule. No correlate to identified nodule on 8/11/23 [facility]Criss She is a former smoker, quit many years ago.  She has lost a few pounds but that was due to her recent surgery.  She denies recent breast changes, headaches, dizziness, visual changes, balance issues, CP, cough, SOB, n/v/d, constipation, breast changes or new bone pain.  Health Care Maintenance: Updated 5/20/24 Mammogram: MMG/US 4/4/24- BIRADS 2 Colonoscopy: 2023 - no findings per pt Pap smear: 2023, normal  PCP: Dr. Lillian Barrett ([facility]) [phone] Genetic screen: [employer] genetics 8/11/22 - NEGATIVE DEXA: 11/10/22 - T-score -1.7 femoral neck - results discussed today COVID vaccination: s/p 2 doses and 1 booster

## 2024-05-20 NOTE — PHYSICAL EXAM
[Fully active, able to carry on all pre-disease performance without restriction] : Status 0 - Fully active, able to carry on all pre-disease performance without restriction [Normal] : affect appropriate [de-identified] : generally well appearing female, NAD  [de-identified] : s/p R breast biopsy, well healed, no palpable masses or lymphadenopathy bilaterally

## 2024-05-20 NOTE — HISTORY OF PRESENT ILLNESS
[de-identified] : Referred by: Dr. Lillian Barrett ([facility]) [phone] \par  Diagnosis: R breast DCIS \par  Here with daughter Keara ([phone]) who provided translation per patient request \par  \par  Ms. Colon is a post-menopausal female who presented at age 67 in 2022 for evaluation of R breast DCIS, ER/IL negative. \par  The patient has a medical history of arthritis, HTN, HLD. \par  \par  Presenting HPI: Magui reports that she recently underwent a screening mammogram on 2022 which revealed calcifications in the right breast.  She goes for annual breast imaging and states that she had an abnormality once in the right breast ~7-8 years ago and underwent biopsy at that time which resulted as normal.  She subsequently underwent diagnostic mammogram and biopsy on 2022 at [place] which revealed DCIS, high-grade, ER 0%, IL 0%.  She has not yet been referred to breast surgery.\par  \par  At this time she states she is feeling well, she denies any palpable abnormality in the right breast.  She denies any breast pain or nipple discharge.  Her appetite and energy levels are good.\par  \par  Imaging: \par  Diagnostic MMG 22- grouping of calcifications in the upper central right breast, recommend biopsy, BIRADS 4B\par  \par  Path: \par  R breast biopsy 22- DCIS, solid, high grade, ER 0%, IL 0%\par  \par  Genetics: [employer] sent 22 \par  \par  SH: \par  - Occupation: worked as a , now retired \par  - Living situation: lives in [place], she has 2 grown children \par  - Smoking/etoh/illicits: remote smoker, denies etoh, ainsley illicits \par  - Exercise: minimally physically active \par  \par  OB/GYN Hx: \par  - Age of menarche: 12\par  - Age of menopause: 45\par  - Pregnancy history:  \par  - Age at live birth: 25\par  - OCP use: 15 years \par  - Breast feeding history: 8 months \par  \par  FH: \par  - No family history of cancer  [de-identified] : Magui presents on 5/20/24 for follow up for R breast DCIS.  s/p R lumpectomy on 8/31/22 Surgical PATH: DCIS, Gr 3, at least 1.7 cm, ER/MI negative Completed RT w/ Dr. Powell   At today's visit she is generally feeling well. She had bilateral knee replacement in April 2024. She is doing physical therapy.  Using oxycodone and tylenol for pain which is well controlled.  CT lung results, 4/4/24: R upper lobe 0.4cm groundglass pulmonary nodule. No correlate to identified nodule on 8/11/23 [facility]Criss She is a former smoker, quit many years ago.  She has lost a few pounds but that was due to her recent surgery.  She denies recent breast changes, headaches, dizziness, visual changes, balance issues, CP, cough, SOB, n/v/d, constipation, breast changes or new bone pain.  Health Care Maintenance: Updated 5/20/24 Mammogram: MMG/US 4/4/24- BIRADS 2 Colonoscopy: 2023 - no findings per pt Pap smear: 2023, normal  PCP: Dr. Lillian Barrett ([facility]) [phone] Genetic screen: [employer] genetics 8/11/22 - NEGATIVE DEXA: 11/10/22 - T-score -1.7 femoral neck - results discussed today COVID vaccination: s/p 2 doses and 1 booster

## 2024-05-20 NOTE — BEGINNING OF VISIT
[0] : 2) Feeling down, depressed, or hopeless: Not at all (0) [PHQ-2 Negative] : PHQ-2 Negative [[id number]] : 0 [Medication(s)] : Medication(s) [Patient advised of risk of tobacco use and smoking cessation discussed.] : Patient advised of risk of tobacco use and smoking cessation discussed. [Date Discussed (MM/DD/YY): ___] :  Discussed: [unfilled] [With Patient/Caregiver] : with Patient/Caregiver

## 2024-05-20 NOTE — PHYSICAL EXAM
[Fully active, able to carry on all pre-disease performance without restriction] : Status 0 - Fully active, able to carry on all pre-disease performance without restriction [Normal] : affect appropriate [de-identified] : generally well appearing female, NAD  [de-identified] : s/p R breast biopsy, well healed, no palpable masses or lymphadenopathy bilaterally

## 2024-05-20 NOTE — RESULTS/DATA
[FreeTextEntry1] : Ms. Colon is a post-menopausal female who presented at age 67 in August 2022 for evaluation of R breast DCIS, ER/WI negative.  The patient has a medical history of arthritis, HTN, HLD.   R breast DCIS, ER/WI negative. S/p R lumpectomy on 8/31/22 Surgical PATH: DCIS, Gr 3, at least 1.7 cm, Margins, <1 mm at superior, rest >2 mm.  No plan for surgical re-excision  Receptors repeated- remains ER/WI negative.  S/p RT w. Dr. Powell.  Monitoring with surveillance. No indication for anti-estrogen therapy.  CT lung results, 4/4/24: R upper lobe 0.4cm ground-glass pulmonary nodule. Due for CT chest in October 2024 to follow up lung nodule.  Breast imagign 4/2024 negative, repeat in 1 year RTC 6 months for NP visit.

## 2024-05-20 NOTE — BEGINNING OF VISIT
[0] : 2) Feeling down, depressed, or hopeless: Not at all (0) [PHQ-2 Negative] : PHQ-2 Negative [[id number]] : 0 [Medication(s)] : Medication(s) [Patient advised of risk of tobacco use and smoking cessation discussed.] : Patient advised of risk of tobacco use and smoking cessation discussed. [Date Discussed (MM/DD/YY): ___] :  Discussed: [unfilled] [With Patient/Caregiver] : with Patient/Caregiver

## 2024-09-16 ENCOUNTER — APPOINTMENT (OUTPATIENT)
Dept: BREAST CENTER | Facility: CLINIC | Age: 69
End: 2024-09-16
Payer: MEDICARE

## 2024-09-16 VITALS
HEIGHT: 62 IN | SYSTOLIC BLOOD PRESSURE: 123 MMHG | BODY MASS INDEX: 22.73 KG/M2 | WEIGHT: 123.5 LBS | HEART RATE: 71 BPM | DIASTOLIC BLOOD PRESSURE: 65 MMHG

## 2024-09-16 DIAGNOSIS — D05.11 INTRADUCTAL CARCINOMA IN SITU OF RIGHT BREAST: ICD-10-CM

## 2024-09-16 PROCEDURE — 99214 OFFICE O/P EST MOD 30 MIN: CPT

## 2024-09-16 NOTE — ASSESSMENT
[FreeTextEntry1] : PCP is Dr. Thai Lee, DO Referred by Dr. Sheba Levi.  67 y/o Tahir negative,  female here today for follow up for R 8-9:00 DCIS. She is s/p 8/31/22 R lumpectomy for DCIS, ER/OR negative. 8/31/22 Surgical PATH: DCIS, Gr 3, at least 1.7 cm, Margins, <1 mm at superior in area of 3 mm, rest >2 mm.  Sees Dr Powell 9/16/22, discussion about reexcision done and pt did not want reexcision (standard of care rec), she opted for EBRT with boost. Finished RT with Dr. Powell 11/2022. She was to f.u with Dr. Levi but never did nor had the CT chest per PCP. She recently changed PCP's.  Was due for mmg and US last month and pt did not get it.   8/23 MRI with lung nodule, pt was called to review results and messages left but she patient never called back. Certified letter was also mailed 9/23 7/28/22- Robert, Right breast, DCIS, solid, high grade, ER 0%, OR 0%  8/26/22, R MRI bx 8-9:00 PATH: negative for malignancy, FA change, benign and concordant, rec surg/onc mgmt - MRI 1 year of bx lesion  5/23/23 NFR, R dMMG: Extremely dense. Interval postlumpectomy/treatment change. No susp mass, microcals or other sign of malignancy is identified. Resume annual screening BR2  8/11/23 NFR, bilat dMMG: Extremely dense. No susp mass, microcals or other sign of malignancy is identified. Stable postsurgical changes right breast. Rec mmg in 1yr. BR2  8/11/23 NFR, MRI: R- Postop changes and seroma associated with no suspicious enhancement in the right central breast. Enhancement of the right pectoralis muscle is consistent with postop changes which were also demonstrated on the recent mmg. L- no susp enhancement. Axilla/Other: No significant axillary or internal mammary lymphadenopathy. Stable costophrenic LNs. Stable T2 bright subcentimeter right lobe hepatic nodule. New 4 mm subpleural lung nodule. Impression: No MRI evidence of breast malignancy. Incidentally noted is a 4 mm subpleural right lung nodule for which further evaluation with CT scan of the chest is suggested. Recommend: Resume annual mmg on schedule BR2. MRI was done as f.u since she had benign biopsy MRI 8/26/23.  4/8/24 Robert: Bilat mmg, dense breasts, Right postop changes. Left negative. BR2. 4/11/24 CT chest, Right upper 4 mm groundglass pulmonary nodule.  Chest CT 10/24 per Dr. Levi.  Pt was a smoker in the past.  No Fhx of cancer.  CBE: Right: 12:00 inc well healed and no hematoma or cellulitis. Post radiation changes. No axillary or SC lymphadenopathy. Left negative. No axillary or SC lymphadenopathy. Pt still did not get her CT yet. She also changed PCP and today say her and told her she did not need any studies. WIll order the CT today but discussed further f.u chest CT should be with PCP. Pt also never followed up with Dr. Levi, she is DCIS HR negative but they had not discharged. Also rec appt with DR. Powell for F.u. SHe is also overdue for her mmg and u/s.    PLAN: CT chest f.u due 10/25 Bilat mgm 4/25 (pt does not want to risk no pay for u/s given medicare) F/u appt with Ney eLvi and Andre as scheduled.  F.u after mmg, sooner as needed.

## 2024-09-16 NOTE — PHYSICAL EXAM
[Normocephalic] : normocephalic [Atraumatic] : atraumatic [Supple] : supple [No Supraclavicular Adenopathy] : no supraclavicular adenopathy [No Thyromegaly] : no thyromegaly [Examined in the supine and seated position] : examined in the supine and seated position [Symmetrical] : symmetrical [No dominant masses] : no dominant masses in right breast  [No dominant masses] : no dominant masses left breast [No Nipple Retraction] : no left nipple retraction [No Nipple Discharge] : no left nipple discharge [No Axillary Lymphadenopathy] : no left axillary lymphadenopathy [No Edema] : no edema [No Swelling] : no swelling [Full ROM] : full range of motion [No Rashes] : no rashes [No Ulceration] : no ulceration [Bra Size: ___] : Bra Size: [unfilled] [de-identified] : UOQ incision [TextEntry] : Psych: Appropriate, NAD, A&Ox3 Neuro: Intact grossly, gait normal

## 2024-09-16 NOTE — HISTORY OF PRESENT ILLNESS
[FreeTextEntry1] : PCP is Dr. Thai Lee, DO Referred by Dr. Sheba Levi.  67 y/o Tahir negative,  female here today for follow up for R 8-9:00 DCIS. She is s/p 8/31/22 R lumpectomy for DCIS, ER/[place] negative. 8/31/22 Surgical PATH: DCIS, Gr 3, at least 1.7 cm, Margins, <1 mm at superior in area of 3 mm, rest >2 mm.  Sees Dr Powell 9/16/22, discussion about reexcision done and pt did not want reexcision (standard of care rec), she opted for EBRT with boost. Finished RT with Dr. Powell 11/2022. She was to f.u with Dr. Levi but never did nor had the CT chest per PCP. She recently changed PCP's.  Was due for mmg and US last month and pt did not get it.   8/23 MRI with lung nodule, pt was called to review results and messages left but she patient never called back. Certified letter was also mailed 9/23 7/28/22- Robert, Right breast, DCIS, solid, high grade, ER 0%, [place] 0%  8/26/22, R MRI bx 8-9:00 PATH: negative for malignancy, FA change, benign and concordant, rec surg/onc mgmt - MRI 1 year of bx lesion  5/23/23 NFR, R dMMG: Extremely dense. Interval postlumpectomy/treatment change. No susp mass, microcals or other sign of malignancy is identified. Resume annual screening BR2  8/11/23 NFR, bilat dMMG: Extremely dense. No susp mass, microcals or other sign of malignancy is identified. Stable postsurgical changes right breast. Rec mmg in 1yr. BR2  8/11/23 NFR, MRI: R- Postop changes and seroma associated with no suspicious enhancement in the right central breast. Enhancement of the right pectoralis muscle is consistent with postop changes which were also demonstrated on the recent mmg. L- no susp enhancement. Axilla/Other: No significant axillary or internal mammary lymphadenopathy. Stable costophrenic LNs. Stable T2 bright subcentimeter right lobe hepatic nodule. New 4 mm subpleural lung nodule. Impression: No MRI evidence of breast malignancy. Incidentally noted is a 4 mm subpleural right lung nodule for which further evaluation with CT scan of the chest is suggested. Recommend: Resume annual mmg on schedule BR2. MRI was done as f.u since she had benign biopsy MRI 8/26/23.  4/8/24 Robert: Bilat mmg, dense breasts, Right postop changes. Left negative. BR2. 4/11/24 CT chest, Right upper 4 mm groundglass pulmonary nodule.  Chest CT 10/24 per Dr. Levi.  Pt was a smoker in the past.  No Fhx of cancer.

## 2024-09-16 NOTE — ASSESSMENT
[FreeTextEntry1] : PCP is Dr. Thai Lee, DO Referred by Dr. Sheba Levi.  67 y/o Tahir negative,  female here today for follow up for R 8-9:00 DCIS. She is s/p 8/31/22 R lumpectomy for DCIS, ER/[place] negative. 8/31/22 Surgical PATH: DCIS, Gr 3, at least 1.7 cm, Margins, <1 mm at superior in area of 3 mm, rest >2 mm.  Sees Dr Powell 9/16/22, discussion about reexcision done and pt did not want reexcision (standard of care rec), she opted for EBRT with boost. Finished RT with Dr. Powell 11/2022. She was to f.u with Dr. Levi but never did nor had the CT chest per PCP. She recently changed PCP's.  Was due for mmg and US last month and pt did not get it.   8/23 MRI with lung nodule, pt was called to review results and messages left but she patient never called back. Certified letter was also mailed 9/23 7/28/22- Robert, Right breast, DCIS, solid, high grade, ER 0%, [place] 0%  8/26/22, R MRI bx 8-9:00 PATH: negative for malignancy, FA change, benign and concordant, rec surg/onc mgmt - MRI 1 year of bx lesion  5/23/23 NFR, R dMMG: Extremely dense. Interval postlumpectomy/treatment change. No susp mass, microcals or other sign of malignancy is identified. Resume annual screening BR2  8/11/23 NFR, bilat dMMG: Extremely dense. No susp mass, microcals or other sign of malignancy is identified. Stable postsurgical changes right breast. Rec mmg in 1yr. BR2  8/11/23 NFR, MRI: R- Postop changes and seroma associated with no suspicious enhancement in the right central breast. Enhancement of the right pectoralis muscle is consistent with postop changes which were also demonstrated on the recent mmg. L- no susp enhancement. Axilla/Other: No significant axillary or internal mammary lymphadenopathy. Stable costophrenic LNs. Stable T2 bright subcentimeter right lobe hepatic nodule. New 4 mm subpleural lung nodule. Impression: No MRI evidence of breast malignancy. Incidentally noted is a 4 mm subpleural right lung nodule for which further evaluation with CT scan of the chest is suggested. Recommend: Resume annual mmg on schedule BR2. MRI was done as f.u since she had benign biopsy MRI 8/26/23.  4/8/24 Robert: Bilat mmg, dense breasts, Right postop changes. Left negative. BR2. 4/11/24 CT chest, Right upper 4 mm groundglass pulmonary nodule.  Chest CT 10/24 per Dr. Levi.  Pt was a smoker in the past.  No Fhx of cancer.  CBE: Right: 12:00 inc well healed and no hematoma or cellulitis. Post radiation changes. No axillary or SC lymphadenopathy. Left negative. No axillary or SC lymphadenopathy. Pt still did not get her CT yet. She also changed PCP and today say her and told her she did not need any studies. WIll order the CT today but discussed further f.u chest CT should be with PCP. Pt also never followed up with Dr. Levi, she is DCIS HR negative but they had not discharged. Also rec appt with DR. Powell for F.u. SHe is also overdue for her mmg and u/s.    PLAN: CT chest f.u due 10/25 Bilat mgm 4/25 (pt does not want to risk no pay for u/s given medicare) F/u appt with Ney Levi and Andre as scheduled.  F.u after mmg, sooner as needed.

## 2024-09-16 NOTE — HISTORY OF PRESENT ILLNESS
[FreeTextEntry1] : PCP is Dr. Thai Lee, DO Referred by Dr. Sheba Levi.  69 y/o Tahir negative,  female here today for follow up for R 8-9:00 DCIS. She is s/p 8/31/22 R lumpectomy for DCIS, ER/[place] negative. 8/31/22 Surgical PATH: DCIS, Gr 3, at least 1.7 cm, Margins, <1 mm at superior in area of 3 mm, rest >2 mm.  Sees Dr Powell 9/16/22, discussion about reexcision done and pt did not want reexcision (standard of care rec), she opted for EBRT with boost. Finished RT with Dr. Powell 11/2022. She was to f.u with Dr. Levi but never did nor had the CT chest per PCP. She recently changed PCP's.  Was due for mmg and US last month and pt did not get it.   8/23 MRI with lung nodule, pt was called to review results and messages left but she patient never called back. Certified letter was also mailed 9/23 7/28/22- Robert, Right breast, DCIS, solid, high grade, ER 0%, [place] 0%  8/26/22, R MRI bx 8-9:00 PATH: negative for malignancy, FA change, benign and concordant, rec surg/onc mgmt - MRI 1 year of bx lesion  5/23/23 NFR, R dMMG: Extremely dense. Interval postlumpectomy/treatment change. No susp mass, microcals or other sign of malignancy is identified. Resume annual screening BR2  8/11/23 NFR, bilat dMMG: Extremely dense. No susp mass, microcals or other sign of malignancy is identified. Stable postsurgical changes right breast. Rec mmg in 1yr. BR2  8/11/23 NFR, MRI: R- Postop changes and seroma associated with no suspicious enhancement in the right central breast. Enhancement of the right pectoralis muscle is consistent with postop changes which were also demonstrated on the recent mmg. L- no susp enhancement. Axilla/Other: No significant axillary or internal mammary lymphadenopathy. Stable costophrenic LNs. Stable T2 bright subcentimeter right lobe hepatic nodule. New 4 mm subpleural lung nodule. Impression: No MRI evidence of breast malignancy. Incidentally noted is a 4 mm subpleural right lung nodule for which further evaluation with CT scan of the chest is suggested. Recommend: Resume annual mmg on schedule BR2. MRI was done as f.u since she had benign biopsy MRI 8/26/23.  4/8/24 Robert: Bilat mmg, dense breasts, Right postop changes. Left negative. BR2. 4/11/24 CT chest, Right upper 4 mm groundglass pulmonary nodule.  Chest CT 10/24 per Dr. Levi.  Pt was a smoker in the past.  No Fhx of cancer.

## 2024-09-16 NOTE — PHYSICAL EXAM
[Normocephalic] : normocephalic [Atraumatic] : atraumatic [Supple] : supple [No Supraclavicular Adenopathy] : no supraclavicular adenopathy [No Thyromegaly] : no thyromegaly [Examined in the supine and seated position] : examined in the supine and seated position [Symmetrical] : symmetrical [No dominant masses] : no dominant masses in right breast  [No dominant masses] : no dominant masses left breast [No Nipple Retraction] : no left nipple retraction [No Nipple Discharge] : no left nipple discharge [No Axillary Lymphadenopathy] : no left axillary lymphadenopathy [No Edema] : no edema [No Swelling] : no swelling [Full ROM] : full range of motion [No Rashes] : no rashes [No Ulceration] : no ulceration [Bra Size: ___] : Bra Size: [unfilled] [de-identified] : UOQ incision [TextEntry] : Psych: Appropriate, NAD, A&Ox3 Neuro: Intact grossly, gait normal

## 2024-10-14 ENCOUNTER — APPOINTMENT (OUTPATIENT)
Dept: CT IMAGING | Facility: CLINIC | Age: 69
End: 2024-10-14

## 2024-10-14 ENCOUNTER — APPOINTMENT (OUTPATIENT)
Dept: OBGYN | Facility: CLINIC | Age: 69
End: 2024-10-14
Payer: MEDICARE

## 2024-10-14 VITALS
SYSTOLIC BLOOD PRESSURE: 119 MMHG | WEIGHT: 124 LBS | DIASTOLIC BLOOD PRESSURE: 68 MMHG | HEIGHT: 62 IN | BODY MASS INDEX: 22.82 KG/M2

## 2024-10-14 DIAGNOSIS — Z01.419 ENCOUNTER FOR GYNECOLOGICAL EXAMINATION (GENERAL) (ROUTINE) W/OUT ABNORMAL FINDINGS: ICD-10-CM

## 2024-10-14 PROCEDURE — G0101: CPT

## 2024-10-14 PROCEDURE — 71250 CT THORAX DX C-: CPT

## 2024-10-15 LAB — HPV HIGH+LOW RISK DNA PNL CVX: NOT DETECTED

## 2024-10-19 LAB — CYTOLOGY CVX/VAG DOC THIN PREP: ABNORMAL

## 2024-10-29 ENCOUNTER — NON-APPOINTMENT (OUTPATIENT)
Age: 69
End: 2024-10-29

## 2024-11-06 ENCOUNTER — APPOINTMENT (OUTPATIENT)
Dept: RADIOLOGY | Facility: CLINIC | Age: 69
End: 2024-11-06
Payer: MEDICARE

## 2024-11-06 PROCEDURE — 73610 X-RAY EXAM OF ANKLE: CPT | Mod: LT

## 2024-11-06 PROCEDURE — 73630 X-RAY EXAM OF FOOT: CPT | Mod: LT

## 2024-11-11 ENCOUNTER — NON-APPOINTMENT (OUTPATIENT)
Age: 69
End: 2024-11-11

## 2024-11-11 ENCOUNTER — APPOINTMENT (OUTPATIENT)
Dept: RADIOLOGY | Facility: CLINIC | Age: 69
End: 2024-11-11
Payer: MEDICARE

## 2024-11-11 PROCEDURE — 77085 DXA BONE DENSITY AXL VRT FX: CPT

## 2024-11-13 ENCOUNTER — OUTPATIENT (OUTPATIENT)
Dept: OUTPATIENT SERVICES | Facility: HOSPITAL | Age: 69
LOS: 1 days | End: 2024-11-13

## 2024-11-13 DIAGNOSIS — C50.919 MALIGNANT NEOPLASM OF UNSPECIFIED SITE OF UNSPECIFIED FEMALE BREAST: ICD-10-CM

## 2024-11-18 ENCOUNTER — APPOINTMENT (OUTPATIENT)
Dept: HEMATOLOGY ONCOLOGY | Facility: CLINIC | Age: 69
End: 2024-11-18
Payer: MEDICARE

## 2024-11-18 VITALS
SYSTOLIC BLOOD PRESSURE: 127 MMHG | HEART RATE: 71 BPM | HEIGHT: 62 IN | WEIGHT: 123 LBS | DIASTOLIC BLOOD PRESSURE: 72 MMHG | BODY MASS INDEX: 22.63 KG/M2 | TEMPERATURE: 97.9 F | OXYGEN SATURATION: 98 %

## 2024-11-18 DIAGNOSIS — M85.80 OTHER SPECIFIED DISORDERS OF BONE DENSITY AND STRUCTURE, UNSPECIFIED SITE: ICD-10-CM

## 2024-11-18 DIAGNOSIS — D05.11 INTRADUCTAL CARCINOMA IN SITU OF RIGHT BREAST: ICD-10-CM

## 2024-11-18 DIAGNOSIS — R91.1 SOLITARY PULMONARY NODULE: ICD-10-CM

## 2024-11-18 PROCEDURE — G2211 COMPLEX E/M VISIT ADD ON: CPT

## 2024-11-18 PROCEDURE — 99214 OFFICE O/P EST MOD 30 MIN: CPT

## 2024-11-26 ENCOUNTER — APPOINTMENT (OUTPATIENT)
Dept: MRI IMAGING | Facility: CLINIC | Age: 69
End: 2024-11-26
Payer: MEDICARE

## 2024-11-26 PROCEDURE — 73718 MRI LOWER EXTREMITY W/O DYE: CPT | Mod: LT

## 2025-05-28 ENCOUNTER — OUTPATIENT (OUTPATIENT)
Dept: OUTPATIENT SERVICES | Facility: HOSPITAL | Age: 70
LOS: 1 days | End: 2025-05-28

## 2025-05-28 DIAGNOSIS — C50.919 MALIGNANT NEOPLASM OF UNSPECIFIED SITE OF UNSPECIFIED FEMALE BREAST: ICD-10-CM

## 2025-06-02 ENCOUNTER — APPOINTMENT (OUTPATIENT)
Dept: HEMATOLOGY ONCOLOGY | Facility: CLINIC | Age: 70
End: 2025-06-02
Payer: MEDICARE

## 2025-06-02 VITALS
OXYGEN SATURATION: 98 % | TEMPERATURE: 97.6 F | WEIGHT: 124.8 LBS | SYSTOLIC BLOOD PRESSURE: 149 MMHG | HEIGHT: 62 IN | BODY MASS INDEX: 22.97 KG/M2 | DIASTOLIC BLOOD PRESSURE: 74 MMHG | HEART RATE: 66 BPM

## 2025-06-02 DIAGNOSIS — D05.11 INTRADUCTAL CARCINOMA IN SITU OF RIGHT BREAST: ICD-10-CM

## 2025-06-02 PROCEDURE — 99213 OFFICE O/P EST LOW 20 MIN: CPT

## 2025-06-02 PROCEDURE — G2211 COMPLEX E/M VISIT ADD ON: CPT

## 2025-06-06 ENCOUNTER — NON-APPOINTMENT (OUTPATIENT)
Age: 70
End: 2025-06-06

## 2025-07-01 ENCOUNTER — RESULT REVIEW (OUTPATIENT)
Age: 70
End: 2025-07-01

## 2025-07-01 ENCOUNTER — APPOINTMENT (OUTPATIENT)
Dept: ULTRASOUND IMAGING | Facility: CLINIC | Age: 70
End: 2025-07-01

## 2025-07-01 ENCOUNTER — APPOINTMENT (OUTPATIENT)
Dept: MAMMOGRAPHY | Facility: CLINIC | Age: 70
End: 2025-07-01
Payer: MEDICARE

## 2025-07-01 PROCEDURE — 76641 ULTRASOUND BREAST COMPLETE: CPT | Mod: 50

## 2025-07-01 PROCEDURE — G0279: CPT

## 2025-07-01 PROCEDURE — 77066 DX MAMMO INCL CAD BI: CPT

## 2025-07-08 ENCOUNTER — OFFICE (OUTPATIENT)
Dept: URBAN - METROPOLITAN AREA CLINIC 38 | Facility: CLINIC | Age: 70
Setting detail: OPHTHALMOLOGY
End: 2025-07-08
Payer: COMMERCIAL

## 2025-07-08 DIAGNOSIS — H01.002: ICD-10-CM

## 2025-07-08 DIAGNOSIS — H25.13: ICD-10-CM

## 2025-07-08 DIAGNOSIS — H52.4: ICD-10-CM

## 2025-07-08 DIAGNOSIS — H35.373: ICD-10-CM

## 2025-07-08 DIAGNOSIS — H01.001: ICD-10-CM

## 2025-07-08 PROBLEM — H01.004 BLEPHARITIS; RIGHT UPPER LID, RIGHT LOWER LID, LEFT UPPER LID, LEFT LOWER LID: Status: ACTIVE | Noted: 2025-07-08

## 2025-07-08 PROBLEM — H01.005 BLEPHARITIS; RIGHT UPPER LID, RIGHT LOWER LID, LEFT UPPER LID, LEFT LOWER LID: Status: ACTIVE | Noted: 2025-07-08

## 2025-07-08 PROBLEM — H31.091 CHORIORETINAL SCARS, OTHER; RIGHT EYE: Status: ACTIVE | Noted: 2025-07-08

## 2025-07-08 PROBLEM — H52.7 REFRACTIVE ERROR: Status: ACTIVE | Noted: 2025-07-08

## 2025-07-08 PROBLEM — H11.153 PINGUECULA; BOTH EYES: Status: ACTIVE | Noted: 2025-07-08

## 2025-07-08 PROCEDURE — 92015 DETERMINE REFRACTIVE STATE: CPT | Performed by: OPHTHALMOLOGY

## 2025-07-08 PROCEDURE — 92134 CPTRZ OPH DX IMG PST SGM RTA: CPT | Performed by: OPHTHALMOLOGY

## 2025-07-08 PROCEDURE — 92014 COMPRE OPH EXAM EST PT 1/>: CPT | Performed by: OPHTHALMOLOGY

## 2025-07-08 ASSESSMENT — TONOMETRY
OD_IOP_MMHG: 13
OS_IOP_MMHG: 13

## 2025-07-08 ASSESSMENT — REFRACTION_MANIFEST
OS_SPHERE: +0.75
OD_ADD: +2.75
OD_VA2: 20/20(J1+)
OU_VA: 20/20-1
OD_ADD: +2.75
OD_CYLINDER: -1.25
OD_SPHERE: +0.50
OS_SPHERE: +0.50
OD_SPHERE: +1.25
OS_AXIS: 095
OS_CYLINDER: -0.75
OS_ADD: +2.75
OS_VA2: 20/20(J1+)
OS_CYLINDER: -0.75
OS_AXIS: 090
OD_VA1: 20/25
OD_AXIS: 085
OS_VA1: 20/25
OS_ADD: +2.75
OD_AXIS: 090
OD_CYLINDER: -0.75

## 2025-07-08 ASSESSMENT — KERATOMETRY
OD_AXISANGLE_DEGREES: 007
OD_K1POWER_DIOPTERS: 43.50
OS_K2POWER_DIOPTERS: 44.50
METHOD_AUTO_MANUAL: AUTO
OS_K1POWER_DIOPTERS: 43.75
OD_K2POWER_DIOPTERS: 44.75
OS_AXISANGLE_DEGREES: 176

## 2025-07-08 ASSESSMENT — REFRACTION_CURRENTRX
OD_ADD: +2.75
OS_OVR_VA: 20/
OD_OVR_VA: 20/
OS_VPRISM_DIRECTION: PROGS
OS_CYLINDER: -0.50
OS_AXIS: 099
OS_SPHERE: +0.50
OD_AXIS: 079
OD_VPRISM_DIRECTION: PROGS
OD_CYLINDER: -0.75
OD_SPHERE: +0.50
OS_ADD: +2.75

## 2025-07-08 ASSESSMENT — REFRACTION_AUTOREFRACTION
OD_SPHERE: +1.75
OD_AXIS: 091
OS_SPHERE: +1.25
OS_AXIS: 097
OS_CYLINDER: -1.75
OD_CYLINDER: -2.00

## 2025-07-08 ASSESSMENT — CONFRONTATIONAL VISUAL FIELD TEST (CVF)
OS_FINDINGS: FULL
OD_FINDINGS: FULL

## 2025-07-08 ASSESSMENT — VISUAL ACUITY
OD_BCVA: 20/40-
OS_BCVA: 20/40-

## 2025-07-08 ASSESSMENT — LID EXAM ASSESSMENTS
OS_BLEPHARITIS: LLL LUL 1+
OD_BLEPHARITIS: RLL RUL 1+

## 2025-07-16 ENCOUNTER — APPOINTMENT (OUTPATIENT)
Facility: CLINIC | Age: 70
End: 2025-07-16

## 2025-07-22 ENCOUNTER — APPOINTMENT (OUTPATIENT)
Dept: ULTRASOUND IMAGING | Facility: CLINIC | Age: 70
End: 2025-07-22
Payer: MEDICARE

## 2025-07-22 PROCEDURE — 76536 US EXAM OF HEAD AND NECK: CPT

## 2025-07-28 ENCOUNTER — NON-APPOINTMENT (OUTPATIENT)
Age: 70
End: 2025-07-28

## 2025-07-28 ENCOUNTER — APPOINTMENT (OUTPATIENT)
Dept: BREAST CENTER | Facility: CLINIC | Age: 70
End: 2025-07-28
Payer: MEDICARE

## 2025-07-28 VITALS
OXYGEN SATURATION: 99 % | WEIGHT: 122 LBS | SYSTOLIC BLOOD PRESSURE: 122 MMHG | DIASTOLIC BLOOD PRESSURE: 58 MMHG | TEMPERATURE: 97.8 F | BODY MASS INDEX: 22.45 KG/M2 | HEART RATE: 78 BPM | HEIGHT: 62 IN

## 2025-07-28 DIAGNOSIS — Z78.9 OTHER SPECIFIED HEALTH STATUS: ICD-10-CM

## 2025-07-28 DIAGNOSIS — D05.11 INTRADUCTAL CARCINOMA IN SITU OF RIGHT BREAST: ICD-10-CM

## 2025-07-28 PROCEDURE — 99215 OFFICE O/P EST HI 40 MIN: CPT

## 2025-08-29 ENCOUNTER — APPOINTMENT (OUTPATIENT)
Dept: CARDIOLOGY | Facility: CLINIC | Age: 70
End: 2025-08-29
Payer: MEDICARE

## 2025-08-29 VITALS
SYSTOLIC BLOOD PRESSURE: 126 MMHG | BODY MASS INDEX: 22.5 KG/M2 | HEART RATE: 86 BPM | WEIGHT: 123 LBS | OXYGEN SATURATION: 98 % | DIASTOLIC BLOOD PRESSURE: 62 MMHG

## 2025-08-29 DIAGNOSIS — Z13.6 ENCOUNTER FOR SCREENING FOR CARDIOVASCULAR DISORDERS: ICD-10-CM

## 2025-08-29 DIAGNOSIS — R94.31 ABNORMAL ELECTROCARDIOGRAM [ECG] [EKG]: ICD-10-CM

## 2025-08-29 DIAGNOSIS — I10 ESSENTIAL (PRIMARY) HYPERTENSION: ICD-10-CM

## 2025-08-29 DIAGNOSIS — E78.5 HYPERLIPIDEMIA, UNSPECIFIED: ICD-10-CM

## 2025-08-29 PROCEDURE — G2211 COMPLEX E/M VISIT ADD ON: CPT

## 2025-08-29 PROCEDURE — 99204 OFFICE O/P NEW MOD 45 MIN: CPT

## 2025-08-29 PROCEDURE — 93000 ELECTROCARDIOGRAM COMPLETE: CPT

## 2025-08-29 PROCEDURE — 99214 OFFICE O/P EST MOD 30 MIN: CPT

## 2025-08-29 RX ORDER — ROSUVASTATIN CALCIUM 10 MG/1
10 TABLET, FILM COATED ORAL
Refills: 0 | Status: ACTIVE | COMMUNITY
Start: 2025-08-29